# Patient Record
Sex: MALE | Race: WHITE | NOT HISPANIC OR LATINO | URBAN - METROPOLITAN AREA
[De-identification: names, ages, dates, MRNs, and addresses within clinical notes are randomized per-mention and may not be internally consistent; named-entity substitution may affect disease eponyms.]

---

## 2017-04-05 ENCOUNTER — OUTPATIENT (OUTPATIENT)
Dept: OUTPATIENT SERVICES | Facility: HOSPITAL | Age: 82
LOS: 1 days | Discharge: HOME | End: 2017-04-05

## 2017-06-27 DIAGNOSIS — M46.22 OSTEOMYELITIS OF VERTEBRA, CERVICAL REGION: ICD-10-CM

## 2017-06-27 DIAGNOSIS — E11.9 TYPE 2 DIABETES MELLITUS WITHOUT COMPLICATIONS: ICD-10-CM

## 2017-06-27 DIAGNOSIS — E03.9 HYPOTHYROIDISM, UNSPECIFIED: ICD-10-CM

## 2017-06-27 DIAGNOSIS — E78.5 HYPERLIPIDEMIA, UNSPECIFIED: ICD-10-CM

## 2017-06-27 DIAGNOSIS — E55.9 VITAMIN D DEFICIENCY, UNSPECIFIED: ICD-10-CM

## 2017-06-28 ENCOUNTER — OUTPATIENT (OUTPATIENT)
Dept: OUTPATIENT SERVICES | Facility: HOSPITAL | Age: 82
LOS: 1 days | Discharge: HOME | End: 2017-06-28

## 2017-06-28 DIAGNOSIS — N17.9 ACUTE KIDNEY FAILURE, UNSPECIFIED: ICD-10-CM

## 2017-06-28 DIAGNOSIS — E87.6 HYPOKALEMIA: ICD-10-CM

## 2017-06-28 DIAGNOSIS — E87.1 HYPO-OSMOLALITY AND HYPONATREMIA: ICD-10-CM

## 2017-06-28 DIAGNOSIS — E03.9 HYPOTHYROIDISM, UNSPECIFIED: ICD-10-CM

## 2017-06-28 DIAGNOSIS — J96.00 ACUTE RESPIRATORY FAILURE, UNSPECIFIED WHETHER WITH HYPOXIA OR HYPERCAPNIA: ICD-10-CM

## 2017-06-28 DIAGNOSIS — I21.4 NON-ST ELEVATION (NSTEMI) MYOCARDIAL INFARCTION: ICD-10-CM

## 2017-06-28 DIAGNOSIS — D50.9 IRON DEFICIENCY ANEMIA, UNSPECIFIED: ICD-10-CM

## 2017-06-28 DIAGNOSIS — R78.81 BACTEREMIA: ICD-10-CM

## 2017-06-28 DIAGNOSIS — R50.9 FEVER, UNSPECIFIED: ICD-10-CM

## 2017-06-28 DIAGNOSIS — R60.0 LOCALIZED EDEMA: ICD-10-CM

## 2017-06-28 DIAGNOSIS — E53.8 DEFICIENCY OF OTHER SPECIFIED B GROUP VITAMINS: ICD-10-CM

## 2017-06-28 DIAGNOSIS — M48.50XA COLLAPSED VERTEBRA, NOT ELSEWHERE CLASSIFIED, SITE UNSPECIFIED, INITIAL ENCOUNTER FOR FRACTURE: ICD-10-CM

## 2017-06-28 DIAGNOSIS — G95.20 UNSPECIFIED CORD COMPRESSION: ICD-10-CM

## 2017-06-28 DIAGNOSIS — E11.9 TYPE 2 DIABETES MELLITUS WITHOUT COMPLICATIONS: ICD-10-CM

## 2017-06-28 DIAGNOSIS — R79.89 OTHER SPECIFIED ABNORMAL FINDINGS OF BLOOD CHEMISTRY: ICD-10-CM

## 2017-06-28 DIAGNOSIS — M54.9 DORSALGIA, UNSPECIFIED: ICD-10-CM

## 2017-06-28 DIAGNOSIS — E78.5 HYPERLIPIDEMIA, UNSPECIFIED: ICD-10-CM

## 2017-06-28 DIAGNOSIS — R06.89 OTHER ABNORMALITIES OF BREATHING: ICD-10-CM

## 2018-02-14 ENCOUNTER — OUTPATIENT (OUTPATIENT)
Dept: OUTPATIENT SERVICES | Facility: HOSPITAL | Age: 83
LOS: 1 days | Discharge: HOME | End: 2018-02-14

## 2018-02-14 DIAGNOSIS — R79.89 OTHER SPECIFIED ABNORMAL FINDINGS OF BLOOD CHEMISTRY: ICD-10-CM

## 2018-02-14 DIAGNOSIS — D50.9 IRON DEFICIENCY ANEMIA, UNSPECIFIED: ICD-10-CM

## 2018-02-14 DIAGNOSIS — E55.9 VITAMIN D DEFICIENCY, UNSPECIFIED: ICD-10-CM

## 2018-02-14 DIAGNOSIS — N39.0 URINARY TRACT INFECTION, SITE NOT SPECIFIED: ICD-10-CM

## 2018-02-14 DIAGNOSIS — E03.9 HYPOTHYROIDISM, UNSPECIFIED: ICD-10-CM

## 2018-04-19 ENCOUNTER — OUTPATIENT (OUTPATIENT)
Dept: OUTPATIENT SERVICES | Facility: HOSPITAL | Age: 83
LOS: 1 days | Discharge: HOME | End: 2018-04-19

## 2018-04-19 DIAGNOSIS — D64.9 ANEMIA, UNSPECIFIED: ICD-10-CM

## 2018-04-19 DIAGNOSIS — N39.0 URINARY TRACT INFECTION, SITE NOT SPECIFIED: ICD-10-CM

## 2018-04-19 DIAGNOSIS — R79.89 OTHER SPECIFIED ABNORMAL FINDINGS OF BLOOD CHEMISTRY: ICD-10-CM

## 2018-04-19 DIAGNOSIS — E11.65 TYPE 2 DIABETES MELLITUS WITH HYPERGLYCEMIA: ICD-10-CM

## 2018-05-14 ENCOUNTER — OUTPATIENT (OUTPATIENT)
Dept: OUTPATIENT SERVICES | Facility: HOSPITAL | Age: 83
LOS: 1 days | Discharge: HOME | End: 2018-05-14

## 2018-05-14 DIAGNOSIS — R79.89 OTHER SPECIFIED ABNORMAL FINDINGS OF BLOOD CHEMISTRY: ICD-10-CM

## 2018-05-31 PROBLEM — Z00.00 ENCOUNTER FOR PREVENTIVE HEALTH EXAMINATION: Status: ACTIVE | Noted: 2018-05-31

## 2018-06-01 ENCOUNTER — APPOINTMENT (OUTPATIENT)
Dept: GERIATRICS | Facility: HOME HEALTH | Age: 83
End: 2018-06-01

## 2018-06-18 ENCOUNTER — OUTPATIENT (OUTPATIENT)
Dept: OUTPATIENT SERVICES | Facility: HOSPITAL | Age: 83
LOS: 1 days | Discharge: HOME | End: 2018-06-18

## 2018-06-18 DIAGNOSIS — D64.9 ANEMIA, UNSPECIFIED: ICD-10-CM

## 2018-06-18 DIAGNOSIS — E55.9 VITAMIN D DEFICIENCY, UNSPECIFIED: ICD-10-CM

## 2018-06-18 DIAGNOSIS — E53.8 DEFICIENCY OF OTHER SPECIFIED B GROUP VITAMINS: ICD-10-CM

## 2018-06-18 DIAGNOSIS — E03.9 HYPOTHYROIDISM, UNSPECIFIED: ICD-10-CM

## 2018-06-18 DIAGNOSIS — E11.9 TYPE 2 DIABETES MELLITUS WITHOUT COMPLICATIONS: ICD-10-CM

## 2018-06-18 DIAGNOSIS — R70.0 ELEVATED ERYTHROCYTE SEDIMENTATION RATE: ICD-10-CM

## 2018-06-18 DIAGNOSIS — E78.00 PURE HYPERCHOLESTEROLEMIA, UNSPECIFIED: ICD-10-CM

## 2018-06-18 DIAGNOSIS — R79.89 OTHER SPECIFIED ABNORMAL FINDINGS OF BLOOD CHEMISTRY: ICD-10-CM

## 2018-06-18 DIAGNOSIS — R79.82 ELEVATED C-REACTIVE PROTEIN (CRP): ICD-10-CM

## 2018-07-11 ENCOUNTER — OUTPATIENT (OUTPATIENT)
Dept: OUTPATIENT SERVICES | Facility: HOSPITAL | Age: 83
LOS: 1 days | Discharge: HOME | End: 2018-07-11

## 2018-07-11 DIAGNOSIS — N39.0 URINARY TRACT INFECTION, SITE NOT SPECIFIED: ICD-10-CM

## 2018-08-03 ENCOUNTER — OUTPATIENT (OUTPATIENT)
Dept: OUTPATIENT SERVICES | Facility: HOSPITAL | Age: 83
LOS: 1 days | Discharge: HOME | End: 2018-08-03

## 2018-08-03 DIAGNOSIS — R79.89 OTHER SPECIFIED ABNORMAL FINDINGS OF BLOOD CHEMISTRY: ICD-10-CM

## 2018-08-03 DIAGNOSIS — D64.9 ANEMIA, UNSPECIFIED: ICD-10-CM

## 2018-08-03 DIAGNOSIS — N39.0 URINARY TRACT INFECTION, SITE NOT SPECIFIED: ICD-10-CM

## 2018-08-08 ENCOUNTER — OUTPATIENT (OUTPATIENT)
Dept: OUTPATIENT SERVICES | Facility: HOSPITAL | Age: 83
LOS: 1 days | Discharge: HOME | End: 2018-08-08

## 2018-08-08 ENCOUNTER — LABORATORY RESULT (OUTPATIENT)
Age: 83
End: 2018-08-08

## 2018-08-08 DIAGNOSIS — D64.9 ANEMIA, UNSPECIFIED: ICD-10-CM

## 2018-08-08 DIAGNOSIS — R79.89 OTHER SPECIFIED ABNORMAL FINDINGS OF BLOOD CHEMISTRY: ICD-10-CM

## 2018-08-08 DIAGNOSIS — E78.2 MIXED HYPERLIPIDEMIA: ICD-10-CM

## 2018-08-08 DIAGNOSIS — N39.0 URINARY TRACT INFECTION, SITE NOT SPECIFIED: ICD-10-CM

## 2018-08-10 LAB — NON-GYNECOLOGICAL CYTOLOGY STUDY: SIGNIFICANT CHANGE UP

## 2018-08-13 ENCOUNTER — LABORATORY RESULT (OUTPATIENT)
Age: 83
End: 2018-08-13

## 2018-08-13 ENCOUNTER — OUTPATIENT (OUTPATIENT)
Dept: OUTPATIENT SERVICES | Facility: HOSPITAL | Age: 83
LOS: 1 days | Discharge: HOME | End: 2018-08-13

## 2018-08-13 DIAGNOSIS — R79.89 OTHER SPECIFIED ABNORMAL FINDINGS OF BLOOD CHEMISTRY: ICD-10-CM

## 2018-08-13 DIAGNOSIS — D64.9 ANEMIA, UNSPECIFIED: ICD-10-CM

## 2018-08-13 DIAGNOSIS — R94.5 ABNORMAL RESULTS OF LIVER FUNCTION STUDIES: ICD-10-CM

## 2018-08-16 ENCOUNTER — OUTPATIENT (OUTPATIENT)
Dept: OUTPATIENT SERVICES | Facility: HOSPITAL | Age: 83
LOS: 1 days | Discharge: HOME | End: 2018-08-16

## 2018-08-16 ENCOUNTER — INPATIENT (INPATIENT)
Facility: HOSPITAL | Age: 83
LOS: 3 days | Discharge: HOME HEALTH | End: 2018-08-20
Attending: INTERNAL MEDICINE | Admitting: INTERNAL MEDICINE
Payer: MEDICARE

## 2018-08-16 VITALS
HEART RATE: 69 BPM | HEIGHT: 68 IN | TEMPERATURE: 96 F | DIASTOLIC BLOOD PRESSURE: 87 MMHG | RESPIRATION RATE: 16 BRPM | WEIGHT: 160.06 LBS | OXYGEN SATURATION: 97 % | SYSTOLIC BLOOD PRESSURE: 179 MMHG

## 2018-08-16 DIAGNOSIS — I10 ESSENTIAL (PRIMARY) HYPERTENSION: ICD-10-CM

## 2018-08-16 DIAGNOSIS — K81.9 CHOLECYSTITIS, UNSPECIFIED: ICD-10-CM

## 2018-08-16 DIAGNOSIS — Z98.890 OTHER SPECIFIED POSTPROCEDURAL STATES: Chronic | ICD-10-CM

## 2018-08-16 DIAGNOSIS — R94.5 ABNORMAL RESULTS OF LIVER FUNCTION STUDIES: ICD-10-CM

## 2018-08-16 DIAGNOSIS — C61 MALIGNANT NEOPLASM OF PROSTATE: ICD-10-CM

## 2018-08-16 DIAGNOSIS — N40.1 BENIGN PROSTATIC HYPERPLASIA WITH LOWER URINARY TRACT SYMPTOMS: ICD-10-CM

## 2018-08-16 DIAGNOSIS — M19.90 UNSPECIFIED OSTEOARTHRITIS, UNSPECIFIED SITE: ICD-10-CM

## 2018-08-16 DIAGNOSIS — D64.9 ANEMIA, UNSPECIFIED: ICD-10-CM

## 2018-08-16 DIAGNOSIS — E87.1 HYPO-OSMOLALITY AND HYPONATREMIA: ICD-10-CM

## 2018-08-16 DIAGNOSIS — R79.89 OTHER SPECIFIED ABNORMAL FINDINGS OF BLOOD CHEMISTRY: ICD-10-CM

## 2018-08-16 DIAGNOSIS — E11.8 TYPE 2 DIABETES MELLITUS WITH UNSPECIFIED COMPLICATIONS: ICD-10-CM

## 2018-08-16 DIAGNOSIS — E83.42 HYPOMAGNESEMIA: ICD-10-CM

## 2018-08-16 LAB
ALBUMIN SERPL ELPH-MCNC: 3.2 G/DL — LOW (ref 3.5–5.2)
ALP SERPL-CCNC: 709 U/L — HIGH (ref 30–115)
ALT FLD-CCNC: 23 U/L — SIGNIFICANT CHANGE UP (ref 0–41)
AMMONIA BLD-MCNC: 34 UMOL/L — SIGNIFICANT CHANGE UP (ref 11–55)
ANION GAP SERPL CALC-SCNC: 16 MMOL/L — HIGH (ref 7–14)
APPEARANCE UR: CLEAR — SIGNIFICANT CHANGE UP
APTT BLD: 35 SEC — SIGNIFICANT CHANGE UP (ref 27–39.2)
AST SERPL-CCNC: 65 U/L — HIGH (ref 0–41)
BACTERIA # UR AUTO: ABNORMAL
BASOPHILS # BLD AUTO: 0.03 K/UL — SIGNIFICANT CHANGE UP (ref 0–0.2)
BASOPHILS NFR BLD AUTO: 0.2 % — SIGNIFICANT CHANGE UP (ref 0–1)
BILIRUB SERPL-MCNC: 3.6 MG/DL — HIGH (ref 0.2–1.2)
BILIRUB UR-MCNC: NEGATIVE — SIGNIFICANT CHANGE UP
BUN SERPL-MCNC: 6 MG/DL — LOW (ref 10–20)
CALCIUM SERPL-MCNC: 8.3 MG/DL — LOW (ref 8.5–10.1)
CHLORIDE SERPL-SCNC: 81 MMOL/L — LOW (ref 98–110)
CO2 SERPL-SCNC: 22 MMOL/L — SIGNIFICANT CHANGE UP (ref 17–32)
COLOR SPEC: YELLOW — SIGNIFICANT CHANGE UP
CREAT SERPL-MCNC: 0.8 MG/DL — SIGNIFICANT CHANGE UP (ref 0.7–1.5)
DIFF PNL FLD: ABNORMAL
EOSINOPHIL # BLD AUTO: 0.27 K/UL — SIGNIFICANT CHANGE UP (ref 0–0.7)
EOSINOPHIL NFR BLD AUTO: 1.7 % — SIGNIFICANT CHANGE UP (ref 0–8)
GLUCOSE SERPL-MCNC: 97 MG/DL — SIGNIFICANT CHANGE UP (ref 70–99)
GLUCOSE UR QL: NEGATIVE MG/DL — SIGNIFICANT CHANGE UP
HCT VFR BLD CALC: 38.3 % — LOW (ref 42–52)
HGB BLD-MCNC: 13.4 G/DL — LOW (ref 14–18)
IMM GRANULOCYTES NFR BLD AUTO: 0.3 % — SIGNIFICANT CHANGE UP (ref 0.1–0.3)
INR BLD: 1.91 RATIO — HIGH (ref 0.65–1.3)
KETONES UR-MCNC: NEGATIVE — SIGNIFICANT CHANGE UP
LACTATE SERPL-SCNC: 2.4 MMOL/L — HIGH (ref 0.5–2.2)
LEUKOCYTE ESTERASE UR-ACNC: SIGNIFICANT CHANGE UP
LIDOCAIN IGE QN: 21 U/L — SIGNIFICANT CHANGE UP (ref 7–60)
LYMPHOCYTES # BLD AUTO: 0.6 K/UL — LOW (ref 1.2–3.4)
LYMPHOCYTES # BLD AUTO: 3.7 % — LOW (ref 20.5–51.1)
MAGNESIUM SERPL-MCNC: 1.4 MG/DL — LOW (ref 1.8–2.4)
MCHC RBC-ENTMCNC: 28.4 PG — SIGNIFICANT CHANGE UP (ref 27–31)
MCHC RBC-ENTMCNC: 35 G/DL — SIGNIFICANT CHANGE UP (ref 32–37)
MCV RBC AUTO: 81.1 FL — SIGNIFICANT CHANGE UP (ref 80–94)
MONOCYTES # BLD AUTO: 1.21 K/UL — HIGH (ref 0.1–0.6)
MONOCYTES NFR BLD AUTO: 7.5 % — SIGNIFICANT CHANGE UP (ref 1.7–9.3)
NEUTROPHILS # BLD AUTO: 13.87 K/UL — HIGH (ref 1.4–6.5)
NEUTROPHILS NFR BLD AUTO: 86.6 % — HIGH (ref 42.2–75.2)
NITRITE UR-MCNC: NEGATIVE — SIGNIFICANT CHANGE UP
NRBC # BLD: 0 /100 WBCS — SIGNIFICANT CHANGE UP (ref 0–0)
PH UR: 6 — SIGNIFICANT CHANGE UP (ref 5–8)
PLATELET # BLD AUTO: 114 K/UL — LOW (ref 130–400)
POTASSIUM SERPL-MCNC: 5 MMOL/L — SIGNIFICANT CHANGE UP (ref 3.5–5)
POTASSIUM SERPL-SCNC: 5 MMOL/L — SIGNIFICANT CHANGE UP (ref 3.5–5)
PROT SERPL-MCNC: 6.8 G/DL — SIGNIFICANT CHANGE UP (ref 6–8)
PROT UR-MCNC: ABNORMAL MG/DL
PROTHROM AB SERPL-ACNC: 20.9 SEC — HIGH (ref 9.95–12.87)
RBC # BLD: 4.72 M/UL — SIGNIFICANT CHANGE UP (ref 4.7–6.1)
RBC # FLD: 15.6 % — HIGH (ref 11.5–14.5)
RBC CASTS # UR COMP ASSIST: >50 /HPF
SODIUM SERPL-SCNC: 119 MMOL/L — CRITICAL LOW (ref 135–146)
SP GR SPEC: 1.02 — SIGNIFICANT CHANGE UP (ref 1.01–1.03)
UROBILINOGEN FLD QL: 0.2 MG/DL — SIGNIFICANT CHANGE UP (ref 0.2–0.2)
WBC # BLD: 16.03 K/UL — HIGH (ref 4.8–10.8)
WBC # FLD AUTO: 16.03 K/UL — HIGH (ref 4.8–10.8)
WBC UR QL: >50 /HPF

## 2018-08-16 RX ORDER — MAGNESIUM SULFATE 500 MG/ML
2 VIAL (ML) INJECTION ONCE
Qty: 0 | Refills: 0 | Status: COMPLETED | OUTPATIENT
Start: 2018-08-16 | End: 2018-08-16

## 2018-08-16 RX ORDER — NORTRIPTYLINE HYDROCHLORIDE 10 MG/1
0 CAPSULE ORAL
Qty: 0 | Refills: 0 | COMMUNITY

## 2018-08-16 RX ORDER — METOPROLOL TARTRATE 50 MG
1 TABLET ORAL
Qty: 0 | Refills: 0 | COMMUNITY

## 2018-08-16 RX ORDER — CEFEPIME 1 G/1
2000 INJECTION, POWDER, FOR SOLUTION INTRAMUSCULAR; INTRAVENOUS ONCE
Qty: 0 | Refills: 0 | Status: COMPLETED | OUTPATIENT
Start: 2018-08-16 | End: 2018-08-16

## 2018-08-16 RX ORDER — PANTOPRAZOLE SODIUM 20 MG/1
1 TABLET, DELAYED RELEASE ORAL
Qty: 0 | Refills: 0 | COMMUNITY

## 2018-08-16 RX ORDER — MUPIROCIN 20 MG/G
0 OINTMENT TOPICAL
Qty: 0 | Refills: 0 | COMMUNITY

## 2018-08-16 RX ORDER — SODIUM CHLORIDE 9 MG/ML
1000 INJECTION INTRAMUSCULAR; INTRAVENOUS; SUBCUTANEOUS ONCE
Qty: 0 | Refills: 0 | Status: COMPLETED | OUTPATIENT
Start: 2018-08-16 | End: 2018-08-16

## 2018-08-16 RX ORDER — ZOLPIDEM TARTRATE 10 MG/1
1 TABLET ORAL
Qty: 0 | Refills: 0 | COMMUNITY

## 2018-08-16 RX ADMIN — Medication 50 GRAM(S): at 19:37

## 2018-08-16 RX ADMIN — CEFEPIME 100 MILLIGRAM(S): 1 INJECTION, POWDER, FOR SOLUTION INTRAMUSCULAR; INTRAVENOUS at 22:30

## 2018-08-16 RX ADMIN — SODIUM CHLORIDE 1000 MILLILITER(S): 9 INJECTION INTRAMUSCULAR; INTRAVENOUS; SUBCUTANEOUS at 19:37

## 2018-08-16 NOTE — H&P ADULT - NSHPLABSRESULTS_GEN_ALL_CORE
< from: CT Abdomen and Pelvis w/ IV Cont (08.16.18 @ 19:17) >    EXAM:  CT ABDOMEN AND PELVIS IC          PROCEDURE DATE:  08/16/2018      IMPRESSION:   1.  Cirrhosis with evidence of portal hypertension including small volume   abdominopelvic ascites.    2.  Mild left intrahepatic biliary ductal dilation. Correlate with LFTs.    3.  Gallbladder sludge and/or cholelithiasis.    MICKEY UMAÑA M.D., ATTENDINGRADIOLOGIST  This document has been electronically signed. Aug 16 2018  7:27PM      < end of copied text >

## 2018-08-16 NOTE — H&P ADULT - HISTORY OF PRESENT ILLNESS
88m 86yo male being treated at home with oral antibiotics (Cipro and Flagyl) for gall stones (per ER) was advised to come to the hospital by his PMD due to elevated WBC and his wife reported increased abdominal distention. Patient himself (there is mention of some confusion) denies any pain, fevers or vomiting and he claims to be tolerating his liquid diet at home.

## 2018-08-16 NOTE — ED PROVIDER NOTE - OBJECTIVE STATEMENT
85yM with hx of DM, HTN, prostate CA in remission, laminectomy, indwelling enrique catheter presents to ED for abnormal lab values.  Pt states that 85yM with hx of DM, HTN, prostate CA in remission, laminectomy, indwelling enrique catheter presents to ED for abnormal lab values.  Pt's wife states that she was contacted by pt's surgeon, informed that pt had elevated WBC count.  Pt is currently being treated for known cholelithiasis, surgeon expressed concern for possible cholecystitis.  No fever.  No abd pain.  No n/v/d.  No CP, SOB.  No cough.  Wife notes that pt's abd is more distended than typical. 85yM with hx of DM, HTN, prostate CA, laminectomy, indwelling enrique catheter presents to ED for abnormal lab values.  Pt's wife states that she was contacted by pt's PMD, informed that pt had elevated WBC count.  Pt is currently being treated for known cholelithiasis with cipro and flagyl.  No fever.  No abd pain.  No n/v/d.  No CP, SOB.  No cough.  Wife notes that pt's abd is more distended than typical with confusion increasing x a few months.

## 2018-08-16 NOTE — ED ADULT NURSE NOTE - PMH
Arthritis    Diabetes    Diverticulosis    HTN (hypertension)    Osteomyelitis    Pneumonia    Prostate cancer    Sleep apnea

## 2018-08-16 NOTE — ED ADULT TRIAGE NOTE - CHIEF COMPLAINT QUOTE
As per wife: "I  was called by his doctor and was told that his White count is elevated and some other blood results are abnormal. He was being treated with gallstones at home and he might need a procedure."

## 2018-08-16 NOTE — ED PROVIDER NOTE - NS ED ROS FT
Review of Systems    Constitutional: (-) fever/ chills (-) weight loss  Eyes/ENT: (-) blurry vision, (-) epistaxis (-) sore throat (-) ear pain  Cardiovascular: (-) chest pain, (-) syncope (-) palpitations  Respiratory: (-) cough, (-) shortness of breath  Gastrointestinal: (-) vomiting, (-) diarrhea (-) abdominal pain  Musculoskeletal: (-) neck pain, (-) back pain, (-) joint pain (-) pedal edema   Integumentary: (-) rash, (-) swelling  Neurological: (-) headache  Psychiatric: (-) hallucinations

## 2018-08-16 NOTE — ED PROVIDER NOTE - PROGRESS NOTE DETAILS
spoke with oliver of surgery . recommends GI consultation. dr. garay aware of admission dr. smyth evaluated the patient from ICU. patient to be admitted to [facility] surgical floor

## 2018-08-16 NOTE — H&P ADULT - PROBLEM SELECTOR PLAN 1
ductal dilatation-for now will use IV cipro and IV flagyl as recommended by intensivist pending GI recommendation

## 2018-08-16 NOTE — ED PROVIDER NOTE - CARE PLAN
Principal Discharge DX:	Cholecystitis  Secondary Diagnosis:	Hypomagnesemia  Secondary Diagnosis:	Hyponatremia

## 2018-08-16 NOTE — ED PROVIDER NOTE - MEDICAL DECISION MAKING DETAILS
I reviewed outpt labs.  WBC was 6 on  and 11 earlier today.  Sodium has been declining since , 130,  128,  121.  I spoke with Dr Baeza at  who has been involved in pts care.  States that his alk phos has been in the 700s and he has been treated for choleycystitis with po abx and liquid diet.   d/w intensivist, pt to be admitted to floor, surgery aware.  Dr Duffy accepts admission.

## 2018-08-16 NOTE — ED ADULT NURSE NOTE - NSIMPLEMENTINTERV_GEN_ALL_ED
Implemented All Fall Risk Interventions:  Lillian to call system. Call bell, personal items and telephone within reach. Instruct patient to call for assistance. Room bathroom lighting operational. Non-slip footwear when patient is off stretcher. Physically safe environment: no spills, clutter or unnecessary equipment. Stretcher in lowest position, wheels locked, appropriate side rails in place. Provide visual cue, wrist band, yellow gown, etc. Monitor gait and stability. Monitor for mental status changes and reorient to person, place, and time. Review medications for side effects contributing to fall risk. Reinforce activity limits and safety measures with patient and family.

## 2018-08-16 NOTE — ED PROVIDER NOTE - PHYSICAL EXAMINATION
Vital Signs: I have reviewed the initial vital signs.  Constitutional: well-nourished, no acute distress, normocephalic  ENT: MMM  Cardiovascular: regular rate, regular rhythm, no murmur appreciated  Respiratory: unlabored respiratory effort, clear to auscultation bilaterally  Gastrointestinal: soft, non-tender, moderately distended abd (+)fluid wave, no pulsatile mass  Musculoskeletal: supple neck, no lower extremity edema, no bony tenderness  Integumentary: warm, dry, wound noted to back wall, no signs of surrounding infection or redness  Neurologic: awake, alert, extremities’ motor and sensory functions grossly intact, no focal deficits, GCS 15  Psychiatric: appropriate mood, appropriate affect Vital Signs: I have reviewed the initial vital signs.  Constitutional: well-nourished, no acute distress, normocephalic  ENT: MMM  Cardiovascular: regular rate, regular rhythm, no murmur appreciated  Respiratory: unlabored respiratory effort, clear to auscultation bilaterally  Gastrointestinal: soft, non-tender, moderately distended abd (+)fluid wave, no pulsatile mass  :  Sosa catheter in place draining yellow urine with debris.    Musculoskeletal: supple neck, (+)lower extremity edema, no bony tenderness  Integumentary: warm, dry, wound noted to back wall, no signs of surrounding infection or redness  Neurologic: awake, alert, extremities’ motor and sensory functions grossly intact, no focal deficits, GCS 15  Psychiatric: appropriate mood, appropriate affect Vital Signs: I have reviewed the initial vital signs.  Constitutional: chronically illness, no acute distress, normocephalic  ENT: MMM  Cardiovascular: regular rate, regular rhythm, no murmur appreciated  Respiratory: unlabored respiratory effort, clear to auscultation bilaterally  Gastrointestinal: soft, non-tender, moderately distended abd (+)fluid wave, no pulsatile mass  :  Sosa catheter in place draining dark urine with debris.    Musculoskeletal: supple neck, (+)pitting lower extremity edema b/l, no bony tenderness  Integumentary: warm, dry, open wound noted to upper back, no signs of surrounding infection or redness  Neurologic: awake, alert, extremities’ motor and sensory functions grossly intact, no focal deficits, GCS 15  Psychiatric: appropriate mood, appropriate affect

## 2018-08-16 NOTE — CONSULT NOTE ADULT - SUBJECTIVE AND OBJECTIVE BOX
Patient is a 85y old  Male who presents with a chief complaint of abdminal pain x 5 days failed out pt therapy, also found to be hyponatremic. PMHx of DM HTN     Social hx : non smoker  Family hx: none       REVIEW OF SYSTEMS  General: abdominal pain   Skin/Breast:none	  Ophthalmologic:none  ENMT:	none  Respiratory and Thorax: sob occasionally 	  Cardiovascular:	none  Gastrointestinal:	 MONA pain   Genitourinary:	    Allergies  No Known Allergies    T(F): 96.4 (08-16-18 @ 16:33), Max: 96.4 (08-16-18 @ 16:33)  HR: 69 (08-16-18 @ 16:33)  BP: 179/87 (08-16-18 @ 16:33)  RR: 16 (08-16-18 @ 16:33)  SpO2: 97% (08-16-18 @ 16:33) (97% - 97%)      PHYSICAL EXAM:  GENERAL: NAD, well-groomed, well-developed  HEAD:  Atraumatic, Normocephalic  EYES: EOMI, PERRLA, conjunctiva and sclera clear  ENMT: No tonsillar erythema, exudates, or enlargement; Moist mucous membranes, Good dentition, No lesions  NECK: Supple, No JVD, Normal thyroid  NERVOUS SYSTEM:  Alert & Oriented X3, Good concentration; Motor Strength 5/5 B/L upper and lower extremities; DTRs 2+ intact and symmetric  CHEST/LUNG: Clear to percussion bilaterally; No rales, rhonchi, wheezing, or rubs  HEART: Regular rate and rhythm; No murmurs, rubs, or gallops  ABDOMEN: Soft, Nontender, Nondistended; Bowel sounds present  EXTREMITIES:  2+ Peripheral Pulses, No clubbing, cyanosis, or edema  LYMPH: No lymphadenopathy noted  SKIN: No rashes or lesions    labs  08-16    119<LL>  |  81<L>  |  6<L>  ----------------------------<  97  5.0   |  22  |  0.8    Ca    8.3<L>      16 Aug 2018 17:30  Mg     1.4     08-16    TPro  6.8  /  Alb  3.2<L>  /  TBili  3.6<H>  /  DBili  x   /  AST  65<H>  /  ALT  23  /  AlkPhos  709<H>  08-16                          13.4   16.03 )-----------( 114      ( 16 Aug 2018 17:30 )             38.3         PT/INR - ( 16 Aug 2018 17:30 )   PT: 20.90 sec;   INR: 1.91 ratio         PTT - ( 16 Aug 2018 17:30 )  PTT:35.0 sec      radiology    cefepime   IVPB 2000 milliGRAM(s) IV Intermittent once

## 2018-08-16 NOTE — ED PROVIDER NOTE - ATTENDING CONTRIBUTION TO CARE
84 yo M PMHx noted including h/o prostate cancer, DM, indwelling Sosa catheter, laminectomy with chronic wound to back presents from home accompanied by wife with c/o abnormal lab results.  Wife explains that patient is being treated for gallbladder problem at home with abx.  He had blood work and sonogram at home.  Today was tod to come to ED for abnormal WBC count and low sodium and that patient may need a procedure.  no fevers.  Pt has been on liquid diet until today when he had some chicken.  On exam pt in NAD AAO x 3, appears pale, Lungs CAT B/L, + open wound noted to upper back, no drainage, abd is softly distended, + tympanic, no rash, NT, + bilateral LE edema

## 2018-08-17 DIAGNOSIS — Z02.9 ENCOUNTER FOR ADMINISTRATIVE EXAMINATIONS, UNSPECIFIED: ICD-10-CM

## 2018-08-17 DIAGNOSIS — A41.9 SEPSIS, UNSPECIFIED ORGANISM: ICD-10-CM

## 2018-08-17 DIAGNOSIS — T84.9XXS UNSPECIFIED COMPLICATION OF INTERNAL ORTHOPEDIC PROSTHETIC DEVICE, IMPLANT AND GRAFT, SEQUELA: ICD-10-CM

## 2018-08-17 DIAGNOSIS — D72.829 ELEVATED WHITE BLOOD CELL COUNT, UNSPECIFIED: ICD-10-CM

## 2018-08-17 DIAGNOSIS — K80.20 CALCULUS OF GALLBLADDER WITHOUT CHOLECYSTITIS WITHOUT OBSTRUCTION: ICD-10-CM

## 2018-08-17 DIAGNOSIS — D68.9 COAGULATION DEFECT, UNSPECIFIED: ICD-10-CM

## 2018-08-17 DIAGNOSIS — K74.60 UNSPECIFIED CIRRHOSIS OF LIVER: ICD-10-CM

## 2018-08-17 DIAGNOSIS — R33.9 RETENTION OF URINE, UNSPECIFIED: ICD-10-CM

## 2018-08-17 LAB
ALBUMIN SERPL ELPH-MCNC: 2.9 G/DL — LOW (ref 3.5–5.2)
ALBUMIN SERPL ELPH-MCNC: 3 G/DL — LOW (ref 3.5–5.2)
ALP SERPL-CCNC: 623 U/L — HIGH (ref 30–115)
ALP SERPL-CCNC: 656 U/L — HIGH (ref 30–115)
ALT FLD-CCNC: 18 U/L — SIGNIFICANT CHANGE UP (ref 0–41)
ALT FLD-CCNC: 20 U/L — SIGNIFICANT CHANGE UP (ref 0–41)
AMMONIA BLD-MCNC: 42 UMOL/L — SIGNIFICANT CHANGE UP (ref 11–55)
ANION GAP SERPL CALC-SCNC: 12 MMOL/L — SIGNIFICANT CHANGE UP (ref 7–14)
ANION GAP SERPL CALC-SCNC: 14 MMOL/L — SIGNIFICANT CHANGE UP (ref 7–14)
APTT BLD: 37.7 SEC — SIGNIFICANT CHANGE UP (ref 27–39.2)
AST SERPL-CCNC: 44 U/L — HIGH (ref 0–41)
AST SERPL-CCNC: 50 U/L — HIGH (ref 0–41)
BILIRUB DIRECT SERPL-MCNC: 2.3 MG/DL — HIGH (ref 0–0.2)
BILIRUB INDIRECT FLD-MCNC: 1.2 MG/DL — SIGNIFICANT CHANGE UP (ref 0.2–1.2)
BILIRUB SERPL-MCNC: 3.5 MG/DL — HIGH (ref 0.2–1.2)
BILIRUB SERPL-MCNC: 3.5 MG/DL — HIGH (ref 0.2–1.2)
BUN SERPL-MCNC: 7 MG/DL — LOW (ref 10–20)
BUN SERPL-MCNC: 7 MG/DL — LOW (ref 10–20)
CALCIUM SERPL-MCNC: 7.6 MG/DL — LOW (ref 8.5–10.1)
CALCIUM SERPL-MCNC: 7.8 MG/DL — LOW (ref 8.5–10.1)
CHLORIDE SERPL-SCNC: 85 MMOL/L — LOW (ref 98–110)
CHLORIDE SERPL-SCNC: 89 MMOL/L — LOW (ref 98–110)
CO2 SERPL-SCNC: 20 MMOL/L — SIGNIFICANT CHANGE UP (ref 17–32)
CO2 SERPL-SCNC: 21 MMOL/L — SIGNIFICANT CHANGE UP (ref 17–32)
CREAT SERPL-MCNC: 0.7 MG/DL — SIGNIFICANT CHANGE UP (ref 0.7–1.5)
CREAT SERPL-MCNC: 0.8 MG/DL — SIGNIFICANT CHANGE UP (ref 0.7–1.5)
GLUCOSE SERPL-MCNC: 103 MG/DL — HIGH (ref 70–99)
GLUCOSE SERPL-MCNC: 89 MG/DL — SIGNIFICANT CHANGE UP (ref 70–99)
HCT VFR BLD CALC: 34.1 % — LOW (ref 42–52)
HCT VFR BLD CALC: 35.8 % — LOW (ref 42–52)
HGB BLD-MCNC: 11.7 G/DL — LOW (ref 14–18)
HGB BLD-MCNC: 12.5 G/DL — LOW (ref 14–18)
INR BLD: 2.05 RATIO — HIGH (ref 0.65–1.3)
MAGNESIUM SERPL-MCNC: 1.7 MG/DL — LOW (ref 1.8–2.4)
MAGNESIUM SERPL-MCNC: 1.8 MG/DL — SIGNIFICANT CHANGE UP (ref 1.8–2.4)
MCHC RBC-ENTMCNC: 28.2 PG — SIGNIFICANT CHANGE UP (ref 27–31)
MCHC RBC-ENTMCNC: 28.5 PG — SIGNIFICANT CHANGE UP (ref 27–31)
MCHC RBC-ENTMCNC: 34.3 G/DL — SIGNIFICANT CHANGE UP (ref 32–37)
MCHC RBC-ENTMCNC: 34.9 G/DL — SIGNIFICANT CHANGE UP (ref 32–37)
MCV RBC AUTO: 81.5 FL — SIGNIFICANT CHANGE UP (ref 80–94)
MCV RBC AUTO: 82.2 FL — SIGNIFICANT CHANGE UP (ref 80–94)
NRBC # BLD: 0 /100 WBCS — SIGNIFICANT CHANGE UP (ref 0–0)
NRBC # BLD: 0 /100 WBCS — SIGNIFICANT CHANGE UP (ref 0–0)
NT-PROBNP SERPL-SCNC: 150 PG/ML — SIGNIFICANT CHANGE UP (ref 0–300)
PLATELET # BLD AUTO: 101 K/UL — LOW (ref 130–400)
PLATELET # BLD AUTO: 102 K/UL — LOW (ref 130–400)
POTASSIUM SERPL-MCNC: 4 MMOL/L — SIGNIFICANT CHANGE UP (ref 3.5–5)
POTASSIUM SERPL-MCNC: 4.2 MMOL/L — SIGNIFICANT CHANGE UP (ref 3.5–5)
POTASSIUM SERPL-SCNC: 4 MMOL/L — SIGNIFICANT CHANGE UP (ref 3.5–5)
POTASSIUM SERPL-SCNC: 4.2 MMOL/L — SIGNIFICANT CHANGE UP (ref 3.5–5)
PROT SERPL-MCNC: 5.8 G/DL — LOW (ref 6–8)
PROT SERPL-MCNC: 6.1 G/DL — SIGNIFICANT CHANGE UP (ref 6–8)
PROTHROM AB SERPL-ACNC: 22.5 SEC — HIGH (ref 9.95–12.87)
RBC # BLD: 4.15 M/UL — LOW (ref 4.7–6.1)
RBC # BLD: 4.39 M/UL — LOW (ref 4.7–6.1)
RBC # FLD: 15.9 % — HIGH (ref 11.5–14.5)
RBC # FLD: 16 % — HIGH (ref 11.5–14.5)
SODIUM SERPL-SCNC: 120 MMOL/L — LOW (ref 135–146)
SODIUM SERPL-SCNC: 121 MMOL/L — LOW (ref 135–146)
WBC # BLD: 13.56 K/UL — HIGH (ref 4.8–10.8)
WBC # BLD: 13.97 K/UL — HIGH (ref 4.8–10.8)
WBC # FLD AUTO: 13.56 K/UL — HIGH (ref 4.8–10.8)
WBC # FLD AUTO: 13.97 K/UL — HIGH (ref 4.8–10.8)

## 2018-08-17 PROCEDURE — 99221 1ST HOSP IP/OBS SF/LOW 40: CPT

## 2018-08-17 RX ORDER — SODIUM CHLORIDE 9 MG/ML
1000 INJECTION INTRAMUSCULAR; INTRAVENOUS; SUBCUTANEOUS
Qty: 0 | Refills: 0 | Status: COMPLETED | OUTPATIENT
Start: 2018-08-17 | End: 2018-08-17

## 2018-08-17 RX ORDER — SODIUM CHLORIDE 9 MG/ML
1000 INJECTION INTRAMUSCULAR; INTRAVENOUS; SUBCUTANEOUS
Qty: 0 | Refills: 0 | Status: DISCONTINUED | OUTPATIENT
Start: 2018-08-17 | End: 2018-08-18

## 2018-08-17 RX ORDER — ZOLPIDEM TARTRATE 10 MG/1
5 TABLET ORAL AT BEDTIME
Qty: 0 | Refills: 0 | Status: DISCONTINUED | OUTPATIENT
Start: 2018-08-17 | End: 2018-08-20

## 2018-08-17 RX ORDER — METRONIDAZOLE 500 MG
500 TABLET ORAL EVERY 8 HOURS
Qty: 0 | Refills: 0 | Status: DISCONTINUED | OUTPATIENT
Start: 2018-08-17 | End: 2018-08-17

## 2018-08-17 RX ORDER — ENOXAPARIN SODIUM 100 MG/ML
40 INJECTION SUBCUTANEOUS DAILY
Qty: 0 | Refills: 0 | Status: DISCONTINUED | OUTPATIENT
Start: 2018-08-17 | End: 2018-08-18

## 2018-08-17 RX ORDER — MEROPENEM 1 G/30ML
500 INJECTION INTRAVENOUS EVERY 8 HOURS
Qty: 0 | Refills: 0 | Status: DISCONTINUED | OUTPATIENT
Start: 2018-08-17 | End: 2018-08-20

## 2018-08-17 RX ORDER — METOPROLOL TARTRATE 50 MG
25 TABLET ORAL
Qty: 0 | Refills: 0 | Status: DISCONTINUED | OUTPATIENT
Start: 2018-08-17 | End: 2018-08-20

## 2018-08-17 RX ORDER — HEPARIN SODIUM 5000 [USP'U]/ML
5000 INJECTION INTRAVENOUS; SUBCUTANEOUS EVERY 12 HOURS
Qty: 0 | Refills: 0 | Status: DISCONTINUED | OUTPATIENT
Start: 2018-08-17 | End: 2018-08-17

## 2018-08-17 RX ORDER — MEROPENEM 1 G/30ML
INJECTION INTRAVENOUS
Qty: 0 | Refills: 0 | Status: DISCONTINUED | OUTPATIENT
Start: 2018-08-17 | End: 2018-08-20

## 2018-08-17 RX ORDER — CIPROFLOXACIN LACTATE 400MG/40ML
200 VIAL (ML) INTRAVENOUS EVERY 12 HOURS
Qty: 0 | Refills: 0 | Status: DISCONTINUED | OUTPATIENT
Start: 2018-08-17 | End: 2018-08-17

## 2018-08-17 RX ORDER — SENNA PLUS 8.6 MG/1
1 TABLET ORAL AT BEDTIME
Qty: 0 | Refills: 0 | Status: DISCONTINUED | OUTPATIENT
Start: 2018-08-17 | End: 2018-08-20

## 2018-08-17 RX ORDER — PANTOPRAZOLE SODIUM 20 MG/1
40 TABLET, DELAYED RELEASE ORAL
Qty: 0 | Refills: 0 | Status: DISCONTINUED | OUTPATIENT
Start: 2018-08-17 | End: 2018-08-20

## 2018-08-17 RX ORDER — MUPIROCIN 20 MG/G
1 OINTMENT TOPICAL
Qty: 0 | Refills: 0 | Status: DISCONTINUED | OUTPATIENT
Start: 2018-08-17 | End: 2018-08-20

## 2018-08-17 RX ORDER — MEROPENEM 1 G/30ML
500 INJECTION INTRAVENOUS ONCE
Qty: 0 | Refills: 0 | Status: COMPLETED | OUTPATIENT
Start: 2018-08-17 | End: 2018-08-17

## 2018-08-17 RX ORDER — ASPIRIN/CALCIUM CARB/MAGNESIUM 324 MG
81 TABLET ORAL DAILY
Qty: 0 | Refills: 0 | Status: DISCONTINUED | OUTPATIENT
Start: 2018-08-18 | End: 2018-08-20

## 2018-08-17 RX ORDER — FENTANYL CITRATE 50 UG/ML
1 INJECTION INTRAVENOUS
Qty: 0 | Refills: 0 | Status: DISCONTINUED | OUTPATIENT
Start: 2018-08-17 | End: 2018-08-20

## 2018-08-17 RX ORDER — NORTRIPTYLINE HYDROCHLORIDE 10 MG/1
10 CAPSULE ORAL AT BEDTIME
Qty: 0 | Refills: 0 | Status: DISCONTINUED | OUTPATIENT
Start: 2018-08-17 | End: 2018-08-20

## 2018-08-17 RX ADMIN — Medication 25 MILLIGRAM(S): at 18:15

## 2018-08-17 RX ADMIN — FENTANYL CITRATE 1 PATCH: 50 INJECTION INTRAVENOUS at 11:47

## 2018-08-17 RX ADMIN — Medication 25 MILLIGRAM(S): at 06:27

## 2018-08-17 RX ADMIN — MUPIROCIN 1 APPLICATION(S): 20 OINTMENT TOPICAL at 18:15

## 2018-08-17 RX ADMIN — MEROPENEM 100 MILLIGRAM(S): 1 INJECTION INTRAVENOUS at 21:22

## 2018-08-17 RX ADMIN — Medication 100 MILLIGRAM(S): at 07:28

## 2018-08-17 RX ADMIN — NORTRIPTYLINE HYDROCHLORIDE 10 MILLIGRAM(S): 10 CAPSULE ORAL at 21:22

## 2018-08-17 RX ADMIN — MUPIROCIN 1 APPLICATION(S): 20 OINTMENT TOPICAL at 06:27

## 2018-08-17 RX ADMIN — Medication 5 MILLIGRAM(S): at 06:27

## 2018-08-17 RX ADMIN — Medication 100 MILLIGRAM(S): at 06:27

## 2018-08-17 RX ADMIN — PANTOPRAZOLE SODIUM 40 MILLIGRAM(S): 20 TABLET, DELAYED RELEASE ORAL at 06:27

## 2018-08-17 RX ADMIN — ENOXAPARIN SODIUM 40 MILLIGRAM(S): 100 INJECTION SUBCUTANEOUS at 11:43

## 2018-08-17 RX ADMIN — SENNA PLUS 1 TABLET(S): 8.6 TABLET ORAL at 21:22

## 2018-08-17 RX ADMIN — SODIUM CHLORIDE 75 MILLILITER(S): 9 INJECTION INTRAMUSCULAR; INTRAVENOUS; SUBCUTANEOUS at 18:14

## 2018-08-17 RX ADMIN — SODIUM CHLORIDE 75 MILLILITER(S): 9 INJECTION INTRAMUSCULAR; INTRAVENOUS; SUBCUTANEOUS at 21:48

## 2018-08-17 RX ADMIN — ZOLPIDEM TARTRATE 5 MILLIGRAM(S): 10 TABLET ORAL at 21:32

## 2018-08-17 RX ADMIN — MEROPENEM 100 MILLIGRAM(S): 1 INJECTION INTRAVENOUS at 11:43

## 2018-08-17 NOTE — CONSULT NOTE ADULT - SUBJECTIVE AND OBJECTIVE BOX
85yM with hx of DM, HTN, prostate CA, laminectomy, indwelling enrique catheter presents to ED for abnormal lab values.  Pt's wife states that she was contacted by pt's PMD, informed that pt had elevated WBC count.  Pt is currently being treated for known cholelithiasis with cipro and flagyl.  No fever.  No abd pain.  No n/v/d.  No CP, SOB.  No cough.  Wife notes that pt's abd is more distended than typical with confusion increasing x a few months. 85yM with hx of DM, HTN, prostate CA, laminectomy, indwelling enrique catheter presents to ED for abnormal lab values.  Pt's wife states that she was contacted by pt's PMD, informed that pt had elevated WBC count.  Pt is currently being treated for known cholelithiasis with cipro and flagyl.  No fever.  No abd pain.  No n/v/d.  No CP, SOB.  No cough.  Wife notes that pt's abd is more distended than typical with confusion increasing x a few months.     Past Medical History:  Arthritis    Diabetes    Diverticulosis    HTN (hypertension)    Osteomyelitis    Pneumonia    Prostate cancer    Sleep apnea.     Past Surgical History:  H/O laminectomy    H/O nephrolithotomy with removal of calculi.    Vital Signs Last 24 Hrs  T(C): 35.5 (17 Aug 2018 00:20), Max: 36.7 (16 Aug 2018 22:19)  T(F): 95.9 (17 Aug 2018 00:20), Max: 98 (16 Aug 2018 22:19)  HR: 84 (17 Aug 2018 00:20) (69 - 84)  BP: 145/67 (17 Aug 2018 00:20) (145/67 - 179/87)  BP(mean): --  RR: 21 (17 Aug 2018 00:20) (16 - 21)  SpO2: 98% (17 Aug 2018 00:20) (97% - 99%) 85yM with hx of DM, HTN, prostate CA, laminectomy, indwelling enrique catheter presents to ED for abnormal lab values.  Pt's wife states that she was contacted by pt's PMD, informed that pt had elevated WBC count.  Pt is currently being treated for known cholelithiasis with cipro and flagyl.  No fever.  No abd pain.  No n/v/d.  No CP, SOB.  No cough.  Wife notes that pt's abd is more distended than typical with confusion increasing x a few months.     Past Medical History:  Arthritis    Diabetes    Diverticulosis    HTN (hypertension)    Osteomyelitis    Pneumonia    Prostate cancer    Sleep apnea.     Past Surgical History:  H/O laminectomy    H/O nephrolithotomy with removal of calculi.    Vital Signs Last 24 Hrs  T(C): 35.5 (17 Aug 2018 00:20), Max: 36.7 (16 Aug 2018 22:19)  T(F): 95.9 (17 Aug 2018 00:20), Max: 98 (16 Aug 2018 22:19)  HR: 84 (17 Aug 2018 00:20) (69 - 84)  BP: 145/67 (17 Aug 2018 00:20) (145/67 - 179/87)  BP(mean): --  RR: 21 (17 Aug 2018 00:20) (16 - 21)  SpO2: 98% (17 Aug 2018 00:20) (97% - 99%)    Constitutional: chronically illness, NAD  	ENT: MMM  	Cardiovascular: regular rate, regular rhythm, no murmur appreciated  	Respiratory: Clear to auscultation bilaterally  	Gastrointestinal: soft, non tender moderately distended abd (+)fluid wave, no pulsatile mass  	:  Enrique catheter in place draining dark urine with debris.    	Musculoskeletal: supple neck, (+)pitting lower extremity edema b/l.  	Integumentary: warm, dry, open wound noted to upper back, no signs of surrounding infection or redness  	Neurologic: awake, alert, extremities’ motor and sensory functions grossly intact, no focal deficits,  Psychiatric: appropriate mood, appropriate affect                              13.4   16.03 )-----------( 114      ( 16 Aug 2018 17:30 )             38.3     08-16    119<LL>  |  81<L>  |  6<L>  ----------------------------<  97  5.0   |  22  |  0.8    Ca    8.3<L>      16 Aug 2018 17:30  Mg     1.4         TPro  6.8  /  Alb  3.2<L>  /  TBili  3.6<H>  /  DBili  x   /  AST  65<H>  /  ALT  23  /  AlkPhos  709<H>            Urinalysis Basic - ( 16 Aug 2018 19:25 )    Color: Yellow / Appearance: Clear / S.020 / pH: x  Gluc: x / Ketone: Negative  / Bili: Negative / Urobili: 0.2 mg/dL   Blood: x / Protein: Trace mg/dL / Nitrite: Negative   Leuk Esterase: Large / RBC: >50 /HPF / WBC >50 /HPF   Sq Epi: x / Non Sq Epi: x / Bacteria: Few      PT/INR - ( 16 Aug 2018 17:30 )   PT: 20.90 sec;   INR: 1.91 ratio         PTT - ( 16 Aug 2018 17:30 )  PTT:35.0 sec      CAPILLARY BLOOD GLUCOSE  89 (16 Aug 2018 17:40)      < from: US Abdomen Limited (18 @ 20:43) >  IMPRESSION:    1.  Cirrhotic liver.    2.  Cholelithiasis and sludgewith nonspecific gallbladder wall   thickening.    3.  Small volume ascites.      < end of copied text >    < from: CT Abdomen and Pelvis w/ IV Cont (18 @ 19:17) >  IMPRESSION:   1.  Cirrhosis with evidence of portal hypertension including small volume   abdominopelvic ascites.    2.  Mild left intrahepatic biliary ductal dilation. Correlate with LFTs.    3.  Gallbladder sludge and/or cholelithiasis.    < end of copied text > 85yM with hx of DM, HTN, prostate CA, laminectomy, indwelling enrique catheter presents to ED for abnormal lab values.  Pt's wife states that she was contacted by pt's PMD, informed that pt had elevated WBC count.  Pt is currently being treated for known cholelithiasis with cipro and flagyl.  No fever.  No abd pain.  No n/v/d.  No CP, SOB.  No cough.  Wife notes that pt's abd is more distended than typical with confusion increasing lately. .      Past Medical History:  Arthritis    Diabetes    Diverticulosis    HTN (hypertension)    Osteomyelitis    Pneumonia    Prostate cancer    Sleep apnea.     Past Surgical History:  H/O laminectomy    H/O nephrolithotomy with removal of calculi.    Vital Signs Last 24 Hrs  T(C): 35.5 (17 Aug 2018 00:20), Max: 36.7 (16 Aug 2018 22:19)  T(F): 95.9 (17 Aug 2018 00:20), Max: 98 (16 Aug 2018 22:19)  HR: 84 (17 Aug 2018 00:20) (69 - 84)  BP: 145/67 (17 Aug 2018 00:20) (145/67 - 179/87)  BP(mean): --  RR: 21 (17 Aug 2018 00:20) (16 - 21)  SpO2: 98% (17 Aug 2018 00:20) (97% - 99%)     Constitutional: chronically illness, NAD  	ENT: MMM  	Cardiovascular: regular rate, regular rhythm, no murmur appreciated  	Respiratory: Clear to auscultation bilaterally  	Gastrointestinal: soft, non tender moderately distended abd (+)fluid wave, no pulsatile mass  	:  Enrique catheter in place draining dark urine with debris.    	Musculoskeletal: supple neck, (+)pitting lower extremity edema b/l.  	Integumentary: warm, dry, open wound noted to upper back, no signs of surrounding infection or redness  	Neurologic: awake, alert, extremities’ motor and sensory functions grossly intact, no focal deficits,    Psychiatric: appropriate mood, appropriate affect                              13.4   16.03 )-----------( 114      ( 16 Aug 2018 17:30 )             38.3     08-16    119<LL>  |  81<L>  |  6<L>  ----------------------------<  97  5.0   |  22  |  0.8    Ca    8.3<L>      16 Aug 2018 17:30  Mg     1.4         TPro  6.8  /  Alb  3.2<L>  /  TBili  3.6<H>  /  DBili  x   /  AST  65<H>  /  ALT  23  /  AlkPhos  709<H>            Urinalysis Basic - ( 16 Aug 2018 19:25 )    Color: Yellow / Appearance: Clear / S.020 / pH: x  Gluc: x / Ketone: Negative  / Bili: Negative / Urobili: 0.2 mg/dL   Blood: x / Protein: Trace mg/dL / Nitrite: Negative   Leuk Esterase: Large / RBC: >50 /HPF / WBC >50 /HPF   Sq Epi: x / Non Sq Epi: x / Bacteria: Few      PT/INR - ( 16 Aug 2018 17:30 )   PT: 20.90 sec;   INR: 1.91 ratio         PTT - ( 16 Aug 2018 17:30 )  PTT:35.0 sec      CAPILLARY BLOOD GLUCOSE  89 (16 Aug 2018 17:40)      < from: US Abdomen Limited (18 @ 20:43) >  IMPRESSION:    1.  Cirrhotic liver.    2.  Cholelithiasis and sludgewith nonspecific gallbladder wall   thickening.    3.  Small volume ascites.      < end of copied text >    < from: CT Abdomen and Pelvis w/ IV Cont (18 @ 19:17) >  IMPRESSION:   1.  Cirrhosis with evidence of portal hypertension including small volume   abdominopelvic ascites.    2.  Mild left intrahepatic biliary ductal dilation. Correlate with LFTs.    3.  Gallbladder sludge and/or cholelithiasis.    < end of copied text >

## 2018-08-17 NOTE — CONSULT NOTE ADULT - SUBJECTIVE AND OBJECTIVE BOX
Gastroenterology Consult    85y yo Male with abnormal liver functions.    Patient is a 85y old  Male who presents with a chief complaint of elevated WBC despite antibiotics (16 Aug 2018 22:30)      HPI:  86yo male being treated at home with oral antibiotics (Cipro and Flagyl) for gall stones (per ER) was advised to come to the hospital by his PMD due to elevated WBC and his wife reported increased abdominal distention. Patient himself (there is mention of some confusion) denies any pain, fevers or vomiting and he claims to be tolerating his liquid diet at home. (16 Aug 2018 22:30)  He denies ever being told of abnormal liver functions, denies alcohol use, denies history of hepatitis.      PAST MEDICAL & SURGICAL HISTORY:  Osteomyelitis  Pneumonia  Arthritis  Prostate cancer  Sleep apnea  Diverticulosis  HTN (hypertension)  Diabetes  H/O nephrolithotomy with removal of calculi  H/O laminectomy      Allergies; No Known Allergies      Medications: enalapril 5 milliGRAM(s) Oral daily  enoxaparin Injectable 40 milliGRAM(s) SubCutaneous daily  fentaNYL   Patch  12 MICROgram(s)/Hr 1 Patch Transdermal every 72 hours  meropenem  IVPB      meropenem  IVPB 500 milliGRAM(s) IV Intermittent every 8 hours  metoprolol tartrate 25 milliGRAM(s) Oral two times a day  mupirocin 2% Ointment 1 Application(s) Topical two times a day  nortriptyline 10 milliGRAM(s) Oral at bedtime  pantoprazole    Tablet 40 milliGRAM(s) Oral before breakfast  senna 1 Tablet(s) Oral at bedtime  zolpidem 5 milliGRAM(s) Oral at bedtime PRN  zolpidem 5 milliGRAM(s) Oral at bedtime PRN      Review of Systems: noncontributory, other than in inital H & P    Physical Examination:  T(C): 36.2 (08-17-18 @ 14:32), Max: 36.7 (08-16-18 @ 22:19)  HR: 78 (08-17-18 @ 14:32) (70 - 84)  BP: 166/68 (08-17-18 @ 14:32) (145/67 - 179/80)  RR: 18 (08-17-18 @ 14:32) (18 - 21)  SpO2: 98% (08-17-18 @ 00:20) (98% - 99%)    GENERAL:  Appears stated age, well-groomed, well-nourished, no distress  HEENT:  NC/AT,  conjunctivae clear and pink, no thyromegaly, nodules, adenopathy, no JVD, sclera -anicteric  CHEST:  Full & symmetric excursion, no increased effort, breath sounds clear  HEART:  Regular rhythm, S1, S2, no murmur/rub/S3/S4, no abdominal bruit, no edema  ABDOMEN:  Soft, non-tender, slightly softlydistended, normoactive bowel sounds,  no masses ,no hepato-splenomegaly, no signs of chronic liver disease  EXTREMITIES:  2 + edema  SKIN:  No rash/erythema/ecchymoses/petechiae/wounds/abscess/warm/dry  NEURO:  Alert, oriented, no asterixis, no tremor, no encephalopathy     Impression: 85y yo Male with abnormal liver functions.  Differential includes toxin/medication or viral effect vs intrinsic liver disease.    Recommendation:  Check Hep A Ab, Hep B SAg, Hep B SAB, Hep C Ab, OLENA, Smooth Muscle antibody, OLENA, Ceruloplasmin, Ferritin, Transferrin Saturation, SPEP  Obain MRCP

## 2018-08-17 NOTE — CONSULT NOTE ADULT - ATTENDING COMMENTS
Pt seen at bedside, with house staff, 0840.  Pt alert and oriented, has no specific c/o.  Chart/labs/imaging reviewed.  H&P as noted.  Abd is soft but distended and tympanitic, no tenderness, palpable masses or hernias, no organomegaly.      IMP ? etiology of WBC; abd benign, GB not likely source, needs w/u including Urine C&S.         Colonic distention may be functional, no obvious lesion noted on CT.  Needs Dulcolax or enemas pr and GI evaluation for poss. endoscopy.         Markedly elevated alk phos also needs w/u tina in the face of cirrhosis, along with correction of electrolytes.           No surgical Rx planned at present. Pt seen at bedside, with house staff, 0840.  Pt alert and oriented, has no specific c/o.  Chart/labs/imaging reviewed.  H&P as noted.  Abd is soft but distended and tympanitic, no tenderness, palpable masses or hernias, no organomegaly.      IMP ? etiology of WBC; abd benign, GB not likely source, needs w/u including Urine C&S.         Colonic distention may be functional, no obvious lesion noted on CT.  Needs Dulcolax or enemas pr and GI evaluation for poss. endoscopy.         Markedly elevated alk phos also needs w/u tina in the face of cirrhosis, along with correction of electrolytes.           No surgical Rx planned at present.  Will follow.

## 2018-08-17 NOTE — PROGRESS NOTE ADULT - SUBJECTIVE AND OBJECTIVE BOX
85 year old gentleman with chronic indwelling enrique with hx prostate cancer is being followed as OP by [facility] MEdical HOme Visit PRogram; he has been treated with oral cipro/flagyl for suspected biliary sepsis but all parameters worsening with increasing wbc and dec serum Na; patient was very reluctant to come to hospital but eventually agreed after conferring with PCP Dr. Maday Baeza.  In ED Na of 119 (last month was NL), wbc of 16 (baseline of 7); abdominal US and abdominal CT both demonstrate cirrhosis with gallstones and sludge, small amount of ascitics and CT describing portal HTN with low volume ascitics admitted and started on IV NS, IV Cipro and IV metronidazole.  Case d/w Dr Baeza, will start Meropenem, consult ID, surgery and GI.  Today pt is c/o weakness, denies abdominal pain, nausea, vomiting, wife by the bedside.     Vital Signs Last 24 Hrs  T(C): 36.2 (17 Aug 2018 14:32), Max: 36.7 (16 Aug 2018 22:19)  T(F): 97.2 (17 Aug 2018 14:32), Max: 98 (16 Aug 2018 22:19)  HR: 78 (17 Aug 2018 14:32) (70 - 84)  BP: 166/68 (17 Aug 2018 14:32) (145/67 - 179/80)  RR: 18 (17 Aug 2018 14:32) (18 - 21)  SpO2: 98% (17 Aug 2018 00:20) (98% - 99%)  PHYSICAL EXAM:  GENERAL: NAD, wheelchair bound   HEAD:  Atraumatic, Normocephalic  NECK: Supple, No JVD, Normal thyroid  NERVOUS SYSTEM:  Alert & Oriented X3, Good concentration; paraplegic   CHEST/LUNG: Clear to percussion bilaterally; No rales, rhonchi, wheezing, or rubs  HEART: Regular rate and rhythm; No murmurs, rubs, or gallops  ABDOMEN: Soft, Nontender, distended with positive BS , Enrique cath with dark urine   EXTREMITIES:  2+ Peripheral Pulses, No clubbing, cyanosis, or edema  LYMPH: No lymphadenopathy noted  SKIN: pt has small open wound on his back , w/o signs of infection ( since )       LABS:                        11.7   13.56 )-----------( 102      ( 17 Aug 2018 11:16 )             34.1     08-17    120<L>  |  85<L>  |  7<L>  ----------------------------<  103<H>  4.2   |  21  |  0.7    Ca    7.8<L>      17 Aug 2018 11:16  Mg     1.7         TPro  6.1  /  Alb  3.0<L>  /  TBili  3.5<H>  /  DBili  x   /  AST  50<H>  /  ALT  20  /  AlkPhos  656<H>      PT/INR - ( 17 Aug 2018 08:58 )   PT: 22.50 sec;   INR: 2.05 ratio         PTT - ( 17 Aug 2018 08:58 )  PTT:37.7 sec  Urinalysis Basic - ( 16 Aug 2018 19:25 )    Color: Yellow / Appearance: Clear / S.020 / pH: x  Gluc: x / Ketone: Negative  / Bili: Negative / Urobili: 0.2 mg/dL   Blood: x / Protein: Trace mg/dL / Nitrite: Negative   Leuk Esterase: Large / RBC: >50 /HPF / WBC >50 /HPF   Sq Epi: x / Non Sq Epi: x / Bacteria: Few    RADIOLOGY & ADDITIONAL TESTS:  < from: US Abdomen Limited (18 @ 20:43) >  IMPRESSION:    1.  Cirrhotic liver.    2.  Cholelithiasis and sludgewith nonspecific gallbladder wall   thickening.    3.  Small volume ascites.    < from: CT Abdomen and Pelvis w/ IV Cont (18 @ 19:17) >  IMPRESSION:   1.  Cirrhosis with evidence of portal hypertension including small volume   abdominopelvic ascites.    2.  Mild left intrahepatic biliary ductal dilation. Correlate with LFTs.    3.  Gallbladder sludge and/or cholelithiasis.    < from: Xray Chest 1 View-PORTABLE IMMEDIATE (18 @ 18:16) >  Impression:      No radiographic evidence of acute cardiopulmonary disease.    MEDICATIONS  (STANDING):  enalapril 5 milliGRAM(s) Oral daily  enoxaparin Injectable 40 milliGRAM(s) SubCutaneous daily  fentaNYL   Patch  12 MICROgram(s)/Hr 1 Patch Transdermal every 72 hours  meropenem  IVPB      meropenem  IVPB 500 milliGRAM(s) IV Intermittent every 8 hours  metoprolol tartrate 25 milliGRAM(s) Oral two times a day  mupirocin 2% Ointment 1 Application(s) Topical two times a day  nortriptyline 10 milliGRAM(s) Oral at bedtime  pantoprazole    Tablet 40 milliGRAM(s) Oral before breakfast  senna 1 Tablet(s) Oral at bedtime  sodium chloride 0.9%. 1000 milliLiter(s) (75 mL/Hr) IV Continuous <Continuous>    MEDICATIONS  (PRN):  zolpidem 5 milliGRAM(s) Oral at bedtime PRN Insomnia  zolpidem 5 milliGRAM(s) Oral at bedtime PRN Insomnia

## 2018-08-17 NOTE — PROGRESS NOTE ADULT - SUBJECTIVE AND OBJECTIVE BOX
85 year old gentleman with chronic indwelling enrique with hx prostate cancer is being followed as OP by [facility] MEdical HOme Visit PRogram; he has been treated with oral cipro/flagyl for suspected biliary sepsis but all parameters worsening with increasing wbc and dec serum Na; patient was very reluctant to come to hospital but eventually agreed after conferring with PCP Dr. Maday Baeza.    In ED Na of 119 (last month was NL), wbc of 16 (baseline of 7); abdominal US and abdominal CT both demonstrate cirrhosis with gallstones and sludge, small amount of ascitis and CT describing portal HTN with low volume ascitis; admitted and started on IV NS, IV cipro and IV metronidazole    This AM he is alert and conversant.  He discussed visit by surgical service.    HE IS MAKING CLEAR TO ME THAT HE DOES NOT WANT EXTRAORDINARY MEASURES TO PROLONG LIFE; THIS MAY ALSO INCLUDE SURGERIES BUT IS NOT OPPOSED TO SOME INTERVENTIONAL PROCEDURES; DISCUSSED THAT WE WILL SEE HOW HE CLINICALLY PROGRESSES AND TAKE INTO ACCOUNT RECOMMENDATIONS OF SUBSPECIALISTS.     Denies pain  151/75; HR 73  afebrile, 96.1 F  conversant  no diaphoresis  abd with diffuse soreness with deep palpation  hematuria gross in enrique  (NOTE THAT HE HAD ENRIQUE CHANGE ABOUT 10 DAYS AGO AND SINCE THEN HAS HAD INTERMITTENT HEMATURIA

## 2018-08-18 LAB
ALBUMIN SERPL ELPH-MCNC: 2.9 G/DL — LOW (ref 3.5–5.2)
ALP SERPL-CCNC: 621 U/L — HIGH (ref 30–115)
ALT FLD-CCNC: 20 U/L — SIGNIFICANT CHANGE UP (ref 0–41)
ANION GAP SERPL CALC-SCNC: 13 MMOL/L — SIGNIFICANT CHANGE UP (ref 7–14)
AST SERPL-CCNC: 66 U/L — HIGH (ref 0–41)
BILIRUB SERPL-MCNC: 3.3 MG/DL — HIGH (ref 0.2–1.2)
BUN SERPL-MCNC: 6 MG/DL — LOW (ref 10–20)
C DIFF BY PCR RESULT: NEGATIVE — SIGNIFICANT CHANGE UP
C DIFF TOX GENS STL QL NAA+PROBE: SIGNIFICANT CHANGE UP
CALCIUM SERPL-MCNC: 7.6 MG/DL — LOW (ref 8.5–10.1)
CALCIUM UR-MCNC: 9 MG/DL — SIGNIFICANT CHANGE UP
CHLORIDE SERPL-SCNC: 89 MMOL/L — LOW (ref 98–110)
CO2 SERPL-SCNC: 18 MMOL/L — SIGNIFICANT CHANGE UP (ref 17–32)
CREAT ?TM UR-MCNC: 51 MG/DL — SIGNIFICANT CHANGE UP
CREAT SERPL-MCNC: 0.7 MG/DL — SIGNIFICANT CHANGE UP (ref 0.7–1.5)
GLUCOSE SERPL-MCNC: 70 MG/DL — SIGNIFICANT CHANGE UP (ref 70–99)
HAV IGM SER-ACNC: SIGNIFICANT CHANGE UP
HBV CORE IGM SER-ACNC: SIGNIFICANT CHANGE UP
HBV SURFACE AG SER-ACNC: SIGNIFICANT CHANGE UP
HCT VFR BLD CALC: 35.1 % — LOW (ref 42–52)
HCV AB S/CO SERPL IA: 0.23 S/CO — SIGNIFICANT CHANGE UP
HCV AB SERPL-IMP: SIGNIFICANT CHANGE UP
HGB BLD-MCNC: 12.3 G/DL — LOW (ref 14–18)
MAGNESIUM SERPL-MCNC: 1.7 MG/DL — LOW (ref 1.8–2.4)
MAGNESIUM UR-MCNC: 6.5 MG/DL — SIGNIFICANT CHANGE UP
MCHC RBC-ENTMCNC: 28.2 PG — SIGNIFICANT CHANGE UP (ref 27–31)
MCHC RBC-ENTMCNC: 35 G/DL — SIGNIFICANT CHANGE UP (ref 32–37)
MCV RBC AUTO: 80.5 FL — SIGNIFICANT CHANGE UP (ref 80–94)
NRBC # BLD: 0 /100 WBCS — SIGNIFICANT CHANGE UP (ref 0–0)
OSMOLALITY UR: 251 MOS/KG — SIGNIFICANT CHANGE UP (ref 50–1400)
PHOSPHATE 24H UR-MCNC: 36 MG/DL — SIGNIFICANT CHANGE UP
PLATELET # BLD AUTO: 91 K/UL — LOW (ref 130–400)
POTASSIUM SERPL-MCNC: 5.4 MMOL/L — HIGH (ref 3.5–5)
POTASSIUM SERPL-SCNC: 5.4 MMOL/L — HIGH (ref 3.5–5)
POTASSIUM UR-SCNC: 15 MMOL/L — SIGNIFICANT CHANGE UP
PROT SERPL-MCNC: 6 G/DL — SIGNIFICANT CHANGE UP (ref 6–8)
RBC # BLD: 4.36 M/UL — LOW (ref 4.7–6.1)
RBC # FLD: 16.3 % — HIGH (ref 11.5–14.5)
SODIUM SERPL-SCNC: 120 MMOL/L — LOW (ref 135–146)
SODIUM UR-SCNC: <20 MMOL/L — SIGNIFICANT CHANGE UP
WBC # BLD: 10.5 K/UL — SIGNIFICANT CHANGE UP (ref 4.8–10.8)
WBC # FLD AUTO: 10.5 K/UL — SIGNIFICANT CHANGE UP (ref 4.8–10.8)

## 2018-08-18 PROCEDURE — 99232 SBSQ HOSP IP/OBS MODERATE 35: CPT

## 2018-08-18 RX ORDER — HEPARIN SODIUM 5000 [USP'U]/ML
5000 INJECTION INTRAVENOUS; SUBCUTANEOUS EVERY 12 HOURS
Qty: 0 | Refills: 0 | Status: DISCONTINUED | OUTPATIENT
Start: 2018-08-18 | End: 2018-08-19

## 2018-08-18 RX ORDER — SODIUM CHLORIDE 9 MG/ML
1000 INJECTION INTRAMUSCULAR; INTRAVENOUS; SUBCUTANEOUS
Qty: 0 | Refills: 0 | Status: DISCONTINUED | OUTPATIENT
Start: 2018-08-18 | End: 2018-08-19

## 2018-08-18 RX ORDER — LACTOBACILLUS ACIDOPHILUS 100MM CELL
1 CAPSULE ORAL
Qty: 0 | Refills: 0 | Status: DISCONTINUED | OUTPATIENT
Start: 2018-08-18 | End: 2018-08-20

## 2018-08-18 RX ORDER — MAGNESIUM SULFATE 500 MG/ML
1 VIAL (ML) INJECTION ONCE
Qty: 0 | Refills: 0 | Status: COMPLETED | OUTPATIENT
Start: 2018-08-18 | End: 2018-08-18

## 2018-08-18 RX ADMIN — MUPIROCIN 1 APPLICATION(S): 20 OINTMENT TOPICAL at 17:37

## 2018-08-18 RX ADMIN — MEROPENEM 100 MILLIGRAM(S): 1 INJECTION INTRAVENOUS at 21:56

## 2018-08-18 RX ADMIN — Medication 1 TABLET(S): at 17:37

## 2018-08-18 RX ADMIN — ZOLPIDEM TARTRATE 5 MILLIGRAM(S): 10 TABLET ORAL at 21:55

## 2018-08-18 RX ADMIN — Medication 5 MILLIGRAM(S): at 05:29

## 2018-08-18 RX ADMIN — MEROPENEM 100 MILLIGRAM(S): 1 INJECTION INTRAVENOUS at 05:30

## 2018-08-18 RX ADMIN — NORTRIPTYLINE HYDROCHLORIDE 10 MILLIGRAM(S): 10 CAPSULE ORAL at 21:55

## 2018-08-18 RX ADMIN — Medication 25 MILLIGRAM(S): at 05:29

## 2018-08-18 RX ADMIN — Medication 100 GRAM(S): at 16:25

## 2018-08-18 RX ADMIN — MUPIROCIN 1 APPLICATION(S): 20 OINTMENT TOPICAL at 05:30

## 2018-08-18 RX ADMIN — SODIUM CHLORIDE 75 MILLILITER(S): 9 INJECTION INTRAMUSCULAR; INTRAVENOUS; SUBCUTANEOUS at 06:07

## 2018-08-18 RX ADMIN — Medication 81 MILLIGRAM(S): at 13:20

## 2018-08-18 RX ADMIN — SODIUM CHLORIDE 50 MILLILITER(S): 9 INJECTION INTRAMUSCULAR; INTRAVENOUS; SUBCUTANEOUS at 11:48

## 2018-08-18 RX ADMIN — PANTOPRAZOLE SODIUM 40 MILLIGRAM(S): 20 TABLET, DELAYED RELEASE ORAL at 06:03

## 2018-08-18 RX ADMIN — HEPARIN SODIUM 5000 UNIT(S): 5000 INJECTION INTRAVENOUS; SUBCUTANEOUS at 17:37

## 2018-08-18 RX ADMIN — MEROPENEM 100 MILLIGRAM(S): 1 INJECTION INTRAVENOUS at 13:22

## 2018-08-18 RX ADMIN — Medication 25 MILLIGRAM(S): at 17:37

## 2018-08-18 NOTE — PROGRESS NOTE ADULT - SUBJECTIVE AND OBJECTIVE BOX
CURRENT COMPLAINTS:  Admitted with elevated WBC and distension.  Pt today says he is less distended, but still looks distended.  He says he does not have pain and wants to go home.  He was placed on meropenem on admission empirically.  He denies abdominal pain, nausea, vomiting but admits to loose stool since he has been on a liquid diet (five days for gallstones ??).  On radiologic imaging the patient has a NL CBD, a nodular liver, L IHDD, GB stones and sludge and small volume ascites.    PAST MEDICAL & SURGICAL HISTORY:  Osteomyelitis  Pneumonia  Arthritis  Prostate cancer  Sleep apnea  Diverticulosis  HTN (hypertension)  Diabetes  H/O nephrolithotomy with removal of calculi  H/O laminectomy      ALLERGIES  No Known Allergies      MEDICATIONS  aspirin  chewable 81 milliGRAM(s) Oral daily  enalapril 5 milliGRAM(s) Oral daily  enoxaparin Injectable 40 milliGRAM(s) SubCutaneous daily  fentaNYL   Patch  12 MICROgram(s)/Hr 1 Patch Transdermal every 72 hours  meropenem  IVPB      meropenem  IVPB 500 milliGRAM(s) IV Intermittent every 8 hours  metoprolol tartrate 25 milliGRAM(s) Oral two times a day  mupirocin 2% Ointment 1 Application(s) Topical two times a day  nortriptyline 10 milliGRAM(s) Oral at bedtime  pantoprazole    Tablet 40 milliGRAM(s) Oral before breakfast  senna 1 Tablet(s) Oral at bedtime  sodium chloride 0.9%. 1000 milliLiter(s) IV Continuous <Continuous>  zolpidem 5 milliGRAM(s) Oral at bedtime PRN  zolpidem 5 milliGRAM(s) Oral at bedtime PRN      Physical Exam:  Vital Signs Last 24 Hrs  T(C): 35.8 (18 Aug 2018 06:00), Max: 36.2 (17 Aug 2018 14:32)  T(F): 96.5 (18 Aug 2018 06:00), Max: 97.2 (17 Aug 2018 14:32)  HR: 69 (18 Aug 2018 06:00) (69 - 78)  BP: 139/64 (18 Aug 2018 06:00) (139/64 - 166/68)  BP(mean): --  RR: 18 (18 Aug 2018 06:00) (18 - 18)  SpO2: --  HEENT:          Anicteric, neck supple, no JVD.  Chest:            No rales, rhonchi or wheezes.  Full breath sounds.  Cardiac:         RRR No S3/S4  Abdomen:     Soft, non-tender, non-distended, normoactive bowel sounds.  Extremities:  No edema  Neurologic:   Alert and oriented x 3.    Laboratories:    CBC Full  -  ( 17 Aug 2018 11:16 )  WBC Count : 13.56 K/uL  Hemoglobin : 11.7 g/dL  Hematocrit : 34.1 %  Platelet Count - Automated : 102 K/uL  Mean Cell Volume : 82.2 fL  Mean Cell Hemoglobin : 28.2 pg  Mean Cell Hemoglobin Concentration : 34.3 g/dL  Auto Neutrophil # : x  Auto Lymphocyte # : x  Auto Monocyte # : x  Auto Eosinophil # : x  Auto Basophil # : x  Auto Neutrophil % : x  Auto Lymphocyte % : x  Auto Monocyte % : x  Auto Eosinophil % : x  Auto Basophil % : x    08-17    120<L>  |  85<L>  |  7<L>  ----------------------------<  103<H>  4.2   |  21  |  0.7    Ca    7.8<L>      17 Aug 2018 11:16  Mg     1.7     08-17    TPro  6.1  /  Alb  3.0<L>  /  TBili  3.5<H>  /  DBili  x   /  AST  50<H>  /  ALT  20  /  AlkPhos  656<H>  08-17    PT/INR - ( 17 Aug 2018 08:58 )   PT: 22.50 sec;   INR: 2.05 ratio         PTT - ( 17 Aug 2018 08:58 )  PTT:37.7 sec      Radiologic Imaging:

## 2018-08-18 NOTE — PROGRESS NOTE ADULT - ATTENDING COMMENTS
Patient seen and examined with surgery team on rounds and discussed management plans. Patient now on isolation with positive urine for staph, patient denies any abdominal pain or symptoms and does not want any surgical intervention. Patient explained. No further surgical followup.

## 2018-08-18 NOTE — PROGRESS NOTE ADULT - SUBJECTIVE AND OBJECTIVE BOX
85 year old gentleman with chronic indwelling enrique with hx prostate cancer is being followed as OP by [facility] MEdical HOme Visit PRogram; he has been treated with oral cipro/flagyl for suspected biliary sepsis but all parameters worsening with increasing wbc and dec serum Na; patient was very reluctant to come to hospital but eventually agreed after conferring with PCP Dr. Maday Baeza.  In ED Na of 119 (last month was NL), wbc of 16 (baseline of 7); abdominal US and abdominal CT both demonstrate cirrhosis with gallstones and sludge, small amount of ascitics and CT describing portal HTN with low volume ascitics admitted and started on IV NS, IV Cipro and IV metronidazole.  Case d/w Dr Baeza, will start Meropenem, consult ID, surgery and GI.  Today had a loose stool, wants to go home, denies abdominal pain,  wife by the bedside.     Vital Signs Last 24 Hrs  T(C): 35.8 (18 Aug 2018 06:00), Max: 36.2 (17 Aug 2018 14:32)  T(F): 96.5 (18 Aug 2018 06:00), Max: 97.2 (17 Aug 2018 14:32)  HR: 69 (18 Aug 2018 06:00) (69 - 78)  BP: 139/64 (18 Aug 2018 06:00) (139/64 - 166/68)  BP(mean): --  RR: 18 (18 Aug 2018 06:00) (18 - 18)    PHYSICAL EXAM:  GENERAL: NAD, wheelchair bound   HEAD:  Atraumatic, Normocephalic  NECK: Supple, No JVD, Normal thyroid  NERVOUS SYSTEM:  Alert & Oriented X3, Good concentration; paraplegic   CHEST/LUNG: decreased BS at bases; No rales, rhonchi, wheezing, or rubs  HEART: Regular rate and rhythm; No murmurs, rubs, or gallops  ABDOMEN: Soft, Nontender, distended with positive BS , Enrique cath with dark urine   EXTREMITIES:  2+ Peripheral Pulses, No clubbing, cyanosis, or edema  LYMPH: No lymphadenopathy noted  SKIN: pt has small open wound on his back , w/o signs of infection ( since )       LABS:                                            12.3   10.50 )-----------( 91       ( 18 Aug 2018 07:40 )             35.1   08-17    120<L>  |  85<L>  |  7<L>  ----------------------------<  103<H>  4.2   |  21  |  0.7    Ca    7.8<L>      17 Aug 2018 11:16  Mg     1.7         TPro  6.1  /  Alb  3.0<L>  /  TBili  3.5<H>  /  DBili  x   /  AST  50<H>  /  ALT  20  /  AlkPhos  656<H>        PT/INR - ( 17 Aug 2018 08:58 )   PT: 22.50 sec;   INR: 2.05 ratio         PTT - ( 17 Aug 2018 08:58 )  PTT:37.7 sec  Urinalysis Basic - ( 16 Aug 2018 19:25 )    Color: Yellow / Appearance: Clear / S.020 / pH: x  Gluc: x / Ketone: Negative  / Bili: Negative / Urobili: 0.2 mg/dL   Blood: x / Protein: Trace mg/dL / Nitrite: Negative   Leuk Esterase: Large / RBC: >50 /HPF / WBC >50 /HPF   Sq Epi: x / Non Sq Epi: x / Bacteria: Few    RADIOLOGY & ADDITIONAL TESTS:  < from: US Abdomen Limited (18 @ 20:43) >  IMPRESSION:    1.  Cirrhotic liver.    2.  Cholelithiasis and sludge with nonspecific gallbladder wall   thickening.    3.  Small volume ascites.    < from: CT Abdomen and Pelvis w/ IV Cont (18 @ 19:17) >  IMPRESSION:   1.  Cirrhosis with evidence of portal hypertension including small volume   abdominopelvic ascites.    2.  Mild left intrahepatic biliary ductal dilation. Correlate with LFTs.    3.  Gallbladder sludge and/or cholelithiasis.    < from: Xray Chest 1 View-PORTABLE IMMEDIATE (18 @ 18:16) >  Impression:      No radiographic evidence of acute cardiopulmonary disease.    MEDICATIONS  (STANDING):  aspirin  chewable 81 milliGRAM(s) Oral daily  enalapril 5 milliGRAM(s) Oral daily  enoxaparin Injectable 40 milliGRAM(s) SubCutaneous daily  fentaNYL   Patch  12 MICROgram(s)/Hr 1 Patch Transdermal every 72 hours  meropenem  IVPB      meropenem  IVPB 500 milliGRAM(s) IV Intermittent every 8 hours  metoprolol tartrate 25 milliGRAM(s) Oral two times a day  mupirocin 2% Ointment 1 Application(s) Topical two times a day  nortriptyline 10 milliGRAM(s) Oral at bedtime  pantoprazole    Tablet 40 milliGRAM(s) Oral before breakfast  senna 1 Tablet(s) Oral at bedtime  sodium chloride 0.9%. 1000 milliLiter(s) (75 mL/Hr) IV Continuous <Continuous>    MEDICATIONS  (PRN):  zolpidem 5 milliGRAM(s) Oral at bedtime PRN Insomnia  zolpidem 5 milliGRAM(s) Oral at bedtime PRN Insomnia

## 2018-08-18 NOTE — CONSULT NOTE ADULT - SUBJECTIVE AND OBJECTIVE BOX
Patient is a 85y old  Male who presents with a chief complaint of elevated WBC despite antibiotics (16 Aug 2018 22:30)      INTERVAL HPI/OVERNIGHT EVENTS:  T(C): 37.1 (08-18-18 @ 14:20), Max: 37.1 (08-18-18 @ 14:20)  HR: 70 (08-18-18 @ 14:20) (69 - 72)  BP: 161/62 (08-18-18 @ 14:20) (139/64 - 161/62)  RR: 18 (08-18-18 @ 14:20) (18 - 18)  SpO2: --  Wt(kg): --  I&O's Summary    17 Aug 2018 07:01  -  18 Aug 2018 07:00  --------------------------------------------------------  IN: 900 mL / OUT: 620 mL / NET: 280 mL    18 Aug 2018 07:01  -  18 Aug 2018 20:33  --------------------------------------------------------  IN: 1000 mL / OUT: 1300 mL / NET: -300 mL        PAST MEDICAL & SURGICAL HISTORY:  Osteomyelitis  Pneumonia  Arthritis  Prostate cancer  Sleep apnea  Diverticulosis  HTN (hypertension)  Diabetes  H/O nephrolithotomy with removal of calculi  H/O laminectomy      SOCIAL HISTORY  Alcohol:  Tobacco:  Illicit substance use:      FAMILY HISTORY:      LABS:                        12.3   10.50 )-----------( 91       ( 18 Aug 2018 07:40 )             35.1     08-18    120<L>  |  89<L>  |  6<L>  ----------------------------<  70  5.4<H>   |  18  |  0.7    Ca    7.6<L>      18 Aug 2018 07:40  Mg     1.7     08-18    TPro  6.0  /  Alb  2.9<L>  /  TBili  3.3<H>  /  DBili  x   /  AST  66<H>  /  ALT  20  /  AlkPhos  621<H>  08-18    PT/INR - ( 17 Aug 2018 08:58 )   PT: 22.50 sec;   INR: 2.05 ratio         PTT - ( 17 Aug 2018 08:58 )  PTT:37.7 sec    CAPILLARY BLOOD GLUCOSE  114 (18 Aug 2018 16:28)  92 (18 Aug 2018 11:30)  96 (17 Aug 2018 21:01)                MEDICATIONS  (STANDING):  aspirin  chewable 81 milliGRAM(s) Oral daily  enalapril 5 milliGRAM(s) Oral daily  fentaNYL   Patch  12 MICROgram(s)/Hr 1 Patch Transdermal every 72 hours  heparin  Injectable 5000 Unit(s) SubCutaneous every 12 hours  lactobacillus acidophilus 1 Tablet(s) Oral two times a day with meals  meropenem  IVPB      meropenem  IVPB 500 milliGRAM(s) IV Intermittent every 8 hours  metoprolol tartrate 25 milliGRAM(s) Oral two times a day  mupirocin 2% Ointment 1 Application(s) Topical two times a day  nortriptyline 10 milliGRAM(s) Oral at bedtime  pantoprazole    Tablet 40 milliGRAM(s) Oral before breakfast  senna 1 Tablet(s) Oral at bedtime  sodium chloride 0.9%. 1000 milliLiter(s) (50 mL/Hr) IV Continuous <Continuous>    MEDICATIONS  (PRN):  zolpidem 5 milliGRAM(s) Oral at bedtime PRN Insomnia  zolpidem 5 milliGRAM(s) Oral at bedtime PRN Insomnia      REVIEW OF SYSTEMS:  CONSTITUTIONAL: No fever, weight loss, or fatigue  EYES: No eye pain, visual disturbances, or discharge  ENMT:  No difficulty hearing, tinnitus, vertigo; No sinus or throat pain  NECK: No pain or stiffness  RESPIRATORY: No cough, wheezing, chills or hemoptysis; No shortness of breath  CARDIOVASCULAR: No chest pain, palpitations, dizziness, or leg swelling  GASTROINTESTINAL: No abdominal or epigastric pain. No nausea, vomiting, or hematemesis; No diarrhea or constipation. No melena or hematochezia.  GENITOURINARY: No dysuria, frequency, hematuria, or incontinence  NEUROLOGICAL: No headaches, memory loss, loss of strength, numbness, or tremors  SKIN: No itching, burning, rashes, or lesions   LYMPH NODES: No enlarged glands  ENDOCRINE: No heat or cold intolerance; No hair loss  MUSCULOSKELETAL: No joint pain or swelling; No muscle, back, or extremity pain  PSYCHIATRIC: No depression, anxiety, mood swings, or difficulty sleeping  HEME/LYMPH: No easy bruising, or bleeding gums  ALLERY AND IMMUNOLOGIC: No hives or eczema    RADIOLOGY & ADDITIONAL TESTS:    Imaging Personally Reviewed:  [ ] YES  [ ] NO    Consultant(s) Notes Reviewed:  [ ] YES  [ ] NO    PHYSICAL EXAM:  GENERAL: NAD, well-groomed, well-developed  HEAD:  Atraumatic, Normocephalic  EYES: EOMI, PERRLA, conjunctiva and sclera clear  ENMT: No tonsillar erythema, exudates, or enlargement; Moist mucous membranes, Good dentition, No lesions  NECK: Supple, No JVD, Normal thyroid  NERVOUS SYSTEM:  Alert & Oriented X3, Good concentration; Motor Strength 5/5 B/L upper and lower extremities; DTRs 2+ intact and symmetric  CHEST/LUNG: Clear to percussion bilaterally; No rales, rhonchi, wheezing, or rubs  HEART: Regular rate and rhythm; No murmurs, rubs, or gallops  ABDOMEN: Soft, Nontender, Nondistended; Bowel sounds present  EXTREMITIES:  2+ Peripheral Pulses, No clubbing, cyanosis, or edema  LYMPH: No lymphadenopathy noted  SKIN: No rashes or lesions    Care Discussed with Consultants/Other Providers [ ] YES  [ ] NO Patient is a 85y old  Male who  is currently being treated for known cholelithiasis with cipro and flagyl, now presents to the ER for evaluation  of  elevated WBC despite antibiotics (16 Aug 2018 22:30)8He has no fever or chills, started on Meropenem and the ID consult requested to assist with further antibiotic management.      REVIEW OF SYSTEMS: Total of twelve systems have been reviewed with patient and found to be negative unless mentioned in HPI        PAST MEDICAL & SURGICAL HISTORY:  Osteomyelitis  Pneumonia  Arthritis  Prostate cancer  Sleep apnea  Diverticulosis  HTN (hypertension)  Diabetes  H/O nephrolithotomy with removal of calculi  H/O laminectomy      SOCIAL HISTORY  Alcohol: Does not drink  Tobacco: Does not smoke  Illicit substance use: None      FAMILY HISTORY: Non contributory to the present illness      ALLERGIES: NKDA      T(C): 37.1 (08-18-18 @ 14:20), Max: 37.1 (08-18-18 @ 14:20)  HR: 70 (08-18-18 @ 14:20) (69 - 72)  BP: 161/62 (08-18-18 @ 14:20) (139/64 - 161/62)  RR: 18 (08-18-18 @ 14:20) (18 - 18)  SpO2: --  Wt(kg): --  I&O's Summary      PHYSICAL EXAM:  GENERAL: Not in distress  CVSL s1 and s2 present  RESP: Air entry B/L  MIKE Abdomen nontender  EXT:  NO pedal edema  CNS: Awake and  alert        LABS:                        12.3   10.50 )-----------( 91       ( 18 Aug 2018 07:40 )             35.1         08-18    120<L>  |  89<L>  |  6<L>  ----------------------------<  70  5.4<H>   |  18  |  0.7    Ca    7.6<L>      18 Aug 2018 07:40  Mg     1.7     08-18    TPro  6.0  /  Alb  2.9<L>  /  TBili  3.3<H>  /  DBili  x   /  AST  66<H>  /  ALT  20  /  AlkPhos  621<H>  08-18    PT/INR - ( 17 Aug 2018 08:58 )   PT: 22.50 sec;   INR: 2.05 ratio           MEDICATIONS  (STANDING):  aspirin  chewable 81 milliGRAM(s) Oral daily  enalapril 5 milliGRAM(s) Oral daily  fentaNYL   Patch  12 MICROgram(s)/Hr 1 Patch Transdermal every 72 hours  heparin  Injectable 5000 Unit(s) SubCutaneous every 12 hours  lactobacillus acidophilus 1 Tablet(s) Oral two times a day with meals  meropenem  IVPB      meropenem  IVPB 500 milliGRAM(s) IV Intermittent every 8 hours  metoprolol tartrate 25 milliGRAM(s) Oral two times a day  mupirocin 2% Ointment 1 Application(s) Topical two times a day  nortriptyline 10 milliGRAM(s) Oral at bedtime  pantoprazole    Tablet 40 milliGRAM(s) Oral before breakfast  senna 1 Tablet(s) Oral at bedtime  sodium chloride 0.9%. 1000 milliLiter(s) (50 mL/Hr) IV Continuous <Continuous>    MEDICATIONS  (PRN):  zolpidem 5 milliGRAM(s) Oral at bedtime PRN Insomnia  zolpidem 5 milliGRAM(s) Oral at bedtime PRN Insomnia        RADIOLOGY & ADDITIONAL TESTS:    < from: US Abdomen Limited (08.16.18 @ 20:43) >  1.  Cirrhotic liver.    2.  Cholelithiasis and sludgewith nonspecific gallbladder wall   thickening.    3.  Small volume ascites.    < end of copied text >    < from: CT Abdomen and Pelvis w/ IV Cont (08.16.18 @ 19:17) >  1.  Cirrhosis with evidence of portal hypertension including small volume   abdominopelvic ascites.    2.  Mild left intrahepatic biliary ductal dilation. Correlate with LFTs.    3.  Gallbladder sludge and/or cholelithiasis.    < end of copied text >

## 2018-08-18 NOTE — PROGRESS NOTE ADULT - SUBJECTIVE AND OBJECTIVE BOX
GENERAL SURGERY PROGRESS NOTE    Patient: BREANN KRAFT , 85y (33)Male   MRN: [id number]  Location: [place] [address]  Visit: 18 Inpatient  Date: 18 @ 09:53      Procedure/Dx/Injuries: cholelithiasis with elevated LFTs    Events of past 24 hours: no acute events o/n, patient tolerating diet, no complaint of abdominal pain. patient states his abd distention is normal and has been present for many years. passing flatus/ +bowel movents.     PAST MEDICAL & SURGICAL HISTORY:  Osteomyelitis  Pneumonia  Arthritis  Prostate cancer  Sleep apnea  Diverticulosis  HTN (hypertension)  Diabetes  H/O nephrolithotomy with removal of calculi  H/O laminectomy    Vitals: T(F): 96.5 (18 @ 06:00), Max: 97.2 (18 @ 14:32)  HR: 69 (18 @ 06:00)  BP: 139/64 (18 @ 06:00)  RR: 18 (18 @ 06:00)  SpO2: --    Diet, DASH/TLC:   Sodium & Cholesterol Restricted  Consistent Carbohydrate No Snacks  1200mL Fluid Restriction ([id number])    Fluids: sodium chloride 0.9%.: Solution, 1000 milliLiter(s) infuse at 75 mL/Hr  Provider's Contact #: [phone]    I & O's:    18 @ 07:01  -  18 @ 07:00  --------------------------------------------------------  IN:    sodium chloride 0.9%: 900 mL  Total IN: 900 mL    OUT:    Indwelling Catheter - Urethral: 620 mL  Total OUT: 620 mL    Total NET: 280 mL    Bowel Movement: : [x] YES [] NO  Flatus: : [x] YES [] NO    PHYSICAL EXAM  GEN: NAD.  ABD: Abd is soft but distended and tympanitic, no tenderness, palpable masses or hernias, no organomegaly. +BS    MEDICATIONS  (STANDING):  aspirin  chewable 81 milliGRAM(s) Oral daily  enalapril 5 milliGRAM(s) Oral daily  enoxaparin Injectable 40 milliGRAM(s) SubCutaneous daily  fentaNYL   Patch  12 MICROgram(s)/Hr 1 Patch Transdermal every 72 hours  meropenem  IVPB      meropenem  IVPB 500 milliGRAM(s) IV Intermittent every 8 hours  metoprolol tartrate 25 milliGRAM(s) Oral two times a day  mupirocin 2% Ointment 1 Application(s) Topical two times a day  nortriptyline 10 milliGRAM(s) Oral at bedtime  pantoprazole    Tablet 40 milliGRAM(s) Oral before breakfast  senna 1 Tablet(s) Oral at bedtime  sodium chloride 0.9%. 1000 milliLiter(s) (75 mL/Hr) IV Continuous <Continuous>    MEDICATIONS  (PRN):  zolpidem 5 milliGRAM(s) Oral at bedtime PRN Insomnia  zolpidem 5 milliGRAM(s) Oral at bedtime PRN Insomnia    DVT PROPHYLAXIS: [x] YES [] NO   GI PROPHYLAXIS: [x] YES [] NO   ANTICOAGULATION: [] YES [x] NO   ANTIBIOTICS: [x] YES [] NO meropenem  IVPB    meropenem  IVPB 500 milliGRAM(s)    LAB/STUDIES:  CAPILLARY BLOOD GLUCOSE  96 (17 Aug 2018 21:01)  97 (17 Aug 2018 16:00)  108 (17 Aug 2018 11:10)               12.3   10.50 )-----------( 91       ( 18 Aug 2018 07:40 )             35.1         120<L>  |  85<L>  |  7<L>  ----------------------------<  103<H>  4.2   |  21  |  0.7    Ca    7.8<L>      17 Aug 2018 11:16  Mg     1.7         TPro  6.1  /  Alb  3.0<L>  /  TBili  3.5<H>  /  DBili  x   /  AST  50<H>  /  ALT  20  /  AlkPhos  656<H>                 6.1  | 3.5  | 50       ------------------[656     ( 17 Aug 2018 11:16 )  3.0  | x    | 20          PT/INR - ( 17 Aug 2018 08:58 )   PT: 22.50 sec;   INR: 2.05 ratio      PTT - ( 17 Aug 2018 08:58 )  PTT:37.7 sec  Urinalysis Basic - ( 16 Aug 2018 19:25 )    Color: Yellow / Appearance: Clear / S.020 / pH: x  Gluc: x / Ketone: Negative  / Bili: Negative / Urobili: 0.2 mg/dL   Blood: x / Protein: Trace mg/dL / Nitrite: Negative   Leuk Esterase: Large / RBC: >50 /HPF / WBC >50 /HPF   Sq Epi: x / Non Sq Epi: x / Bacteria: Few    Culture - Urine (collected 16 Aug 2018 19:25)  Source: .Urine Clean Catch (Midstream)  Preliminary Report (18 Aug 2018 08:53):    >100,000 CFU/ml Staphylococcus aureus GENERAL SURGERY PROGRESS NOTE    Patient: BREANN KRAFT , 85y (33)Male   MRN: [id number]  Location: [place] [address]  Visit: 18 Inpatient  Date: 18 @ 09:53      Procedure/Dx/Injuries: cholelithiasis with elevated LFTs    Events of past 24 hours: no acute events o/n, patient tolerating diet, no complaint of abdominal pain. patient states his abd distention is normal and has been present for many years. passing flatus/ +bowel movents.     PAST MEDICAL & SURGICAL HISTORY:  Osteomyelitis  Pneumonia  Arthritis  Prostate cancer  Sleep apnea  Diverticulosis  HTN (hypertension)  Diabetes  H/O nephrolithotomy with removal of calculi  H/O laminectomy    Vitals: T(F): 96.5 (18 @ 06:00), Max: 97.2 (18 @ 14:32)  HR: 69 (18 @ 06:00)  BP: 139/64 (18 @ 06:00)  RR: 18 (18 @ 06:00)    Diet, DASH/TLC:   Sodium & Cholesterol Restricted  Consistent Carbohydrate No Snacks  1200mL Fluid Restriction ([id number])    Fluids: sodium chloride 0.9%.: Solution, 1000 milliLiter(s) infuse at 75 mL/Hr  Provider's Contact #: [phone]    I & O's:    18 @ 07:01  -  18 @ 07:00  --------------------------------------------------------  IN:    sodium chloride 0.9%: 900 mL  Total IN: 900 mL    OUT:    Indwelling Catheter - Urethral: 620 mL  Total OUT: 620 mL    Total NET: 280 mL    Bowel Movement: : [x] YES [] NO  Flatus: : [x] YES [] NO    PHYSICAL EXAM  GEN: NAD.  ABD: Abd is soft but distended and tympanitic, no tenderness, palpable masses or hernias, no organomegaly. +BS    MEDICATIONS  (STANDING):  aspirin  chewable 81 milliGRAM(s) Oral daily  enalapril 5 milliGRAM(s) Oral daily  enoxaparin Injectable 40 milliGRAM(s) SubCutaneous daily  fentaNYL   Patch  12 MICROgram(s)/Hr 1 Patch Transdermal every 72 hours  meropenem  IVPB      meropenem  IVPB 500 milliGRAM(s) IV Intermittent every 8 hours  metoprolol tartrate 25 milliGRAM(s) Oral two times a day  mupirocin 2% Ointment 1 Application(s) Topical two times a day  nortriptyline 10 milliGRAM(s) Oral at bedtime  pantoprazole    Tablet 40 milliGRAM(s) Oral before breakfast  senna 1 Tablet(s) Oral at bedtime  sodium chloride 0.9%. 1000 milliLiter(s) (75 mL/Hr) IV Continuous <Continuous>    MEDICATIONS  (PRN):  zolpidem 5 milliGRAM(s) Oral at bedtime PRN Insomnia  zolpidem 5 milliGRAM(s) Oral at bedtime PRN Insomnia    DVT PROPHYLAXIS: [x] YES [] NO   GI PROPHYLAXIS: [x] YES [] NO   ANTICOAGULATION: [] YES [x] NO   ANTIBIOTICS: [x] YES [] NO meropenem  IVPB    meropenem  IVPB 500 milliGRAM(s)    LAB/STUDIES:  CAPILLARY BLOOD GLUCOSE  96 (17 Aug 2018 21:01)  97 (17 Aug 2018 16:00)  108 (17 Aug 2018 11:10)               12.3   10.50 )-----------( 91       ( 18 Aug 2018 07:40 )             35.1         120<L>  |  85<L>  |  7<L>  ----------------------------<  103<H>  4.2   |  21  |  0.7    Ca    7.8<L>      17 Aug 2018 11:16  Mg     1.7         TPro  6.1  /  Alb  3.0<L>  /  TBili  3.5<H>  /  DBili  x   /  AST  50<H>  /  ALT  20  /  AlkPhos  656<H>                 6.1  | 3.5  | 50       ------------------[656     ( 17 Aug 2018 11:16 )  3.0  | x    | 20          PT/INR - ( 17 Aug 2018 08:58 )   PT: 22.50 sec;   INR: 2.05 ratio      PTT - ( 17 Aug 2018 08:58 )  PTT:37.7 sec  Urinalysis Basic - ( 16 Aug 2018 19:25 )    Color: Yellow / Appearance: Clear / S.020 / pH: x  Gluc: x / Ketone: Negative  / Bili: Negative / Urobili: 0.2 mg/dL   Blood: x / Protein: Trace mg/dL / Nitrite: Negative   Leuk Esterase: Large / RBC: >50 /HPF / WBC >50 /HPF   Sq Epi: x / Non Sq Epi: x / Bacteria: Few    Culture - Urine (collected 16 Aug 2018 19:25)  Source: .Urine Clean Catch (Midstream)  Preliminary Report (18 Aug 2018 08:53):    >100,000 CFU/ml Staphylococcus aureus

## 2018-08-19 LAB
ALBUMIN SERPL ELPH-MCNC: 2.9 G/DL — LOW (ref 3.5–5.2)
ALP SERPL-CCNC: 801 U/L — HIGH (ref 30–115)
ALT FLD-CCNC: 23 U/L — SIGNIFICANT CHANGE UP (ref 0–41)
ANION GAP SERPL CALC-SCNC: 14 MMOL/L — SIGNIFICANT CHANGE UP (ref 7–14)
AST SERPL-CCNC: 82 U/L — HIGH (ref 0–41)
BILIRUB SERPL-MCNC: 3.2 MG/DL — HIGH (ref 0.2–1.2)
BUN SERPL-MCNC: 7 MG/DL — LOW (ref 10–20)
CALCIUM SERPL-MCNC: 7.5 MG/DL — LOW (ref 8.5–10.1)
CHLORIDE SERPL-SCNC: 94 MMOL/L — LOW (ref 98–110)
CO2 SERPL-SCNC: 20 MMOL/L — SIGNIFICANT CHANGE UP (ref 17–32)
CREAT SERPL-MCNC: 0.7 MG/DL — SIGNIFICANT CHANGE UP (ref 0.7–1.5)
FERRITIN SERPL-MCNC: 160 NG/ML — SIGNIFICANT CHANGE UP (ref 30–400)
GLUCOSE SERPL-MCNC: 92 MG/DL — SIGNIFICANT CHANGE UP (ref 70–99)
HCT VFR BLD CALC: 35.1 % — LOW (ref 42–52)
HGB BLD-MCNC: 12.2 G/DL — LOW (ref 14–18)
MAGNESIUM SERPL-MCNC: 1.8 MG/DL — SIGNIFICANT CHANGE UP (ref 1.8–2.4)
MCHC RBC-ENTMCNC: 28 PG — SIGNIFICANT CHANGE UP (ref 27–31)
MCHC RBC-ENTMCNC: 34.8 G/DL — SIGNIFICANT CHANGE UP (ref 32–37)
MCV RBC AUTO: 80.7 FL — SIGNIFICANT CHANGE UP (ref 80–94)
NRBC # BLD: 0 /100 WBCS — SIGNIFICANT CHANGE UP (ref 0–0)
PLATELET # BLD AUTO: 65 K/UL — LOW (ref 130–400)
POTASSIUM SERPL-MCNC: 4 MMOL/L — SIGNIFICANT CHANGE UP (ref 3.5–5)
POTASSIUM SERPL-SCNC: 4 MMOL/L — SIGNIFICANT CHANGE UP (ref 3.5–5)
PROT SERPL-MCNC: 5.7 G/DL — LOW (ref 6–8)
RBC # BLD: 4.35 M/UL — LOW (ref 4.7–6.1)
RBC # FLD: 16.6 % — HIGH (ref 11.5–14.5)
SMOOTH MUSCLE AB SER-ACNC: ABNORMAL
SODIUM SERPL-SCNC: 128 MMOL/L — LOW (ref 135–146)
T4 FREE SERPL-MCNC: 2 NG/DL — HIGH (ref 0.9–1.8)
TRANSFERRIN SERPL-MCNC: 133 MG/DL — LOW (ref 200–360)
TSH SERPL-MCNC: 2.21 UIU/ML — SIGNIFICANT CHANGE UP (ref 0.27–4.2)
WBC # BLD: 6.75 K/UL — SIGNIFICANT CHANGE UP (ref 4.8–10.8)
WBC # FLD AUTO: 6.75 K/UL — SIGNIFICANT CHANGE UP (ref 4.8–10.8)

## 2018-08-19 RX ORDER — NYSTATIN CREAM 100000 [USP'U]/G
1 CREAM TOPICAL THREE TIMES A DAY
Qty: 0 | Refills: 0 | Status: DISCONTINUED | OUTPATIENT
Start: 2018-08-19 | End: 2018-08-20

## 2018-08-19 RX ADMIN — NYSTATIN CREAM 1 APPLICATION(S): 100000 CREAM TOPICAL at 21:21

## 2018-08-19 RX ADMIN — MEROPENEM 100 MILLIGRAM(S): 1 INJECTION INTRAVENOUS at 21:20

## 2018-08-19 RX ADMIN — HEPARIN SODIUM 5000 UNIT(S): 5000 INJECTION INTRAVENOUS; SUBCUTANEOUS at 05:39

## 2018-08-19 RX ADMIN — MUPIROCIN 1 APPLICATION(S): 20 OINTMENT TOPICAL at 17:46

## 2018-08-19 RX ADMIN — MEROPENEM 100 MILLIGRAM(S): 1 INJECTION INTRAVENOUS at 13:43

## 2018-08-19 RX ADMIN — NORTRIPTYLINE HYDROCHLORIDE 10 MILLIGRAM(S): 10 CAPSULE ORAL at 21:20

## 2018-08-19 RX ADMIN — PANTOPRAZOLE SODIUM 40 MILLIGRAM(S): 20 TABLET, DELAYED RELEASE ORAL at 06:12

## 2018-08-19 RX ADMIN — Medication 25 MILLIGRAM(S): at 05:40

## 2018-08-19 RX ADMIN — Medication 81 MILLIGRAM(S): at 12:19

## 2018-08-19 RX ADMIN — SENNA PLUS 1 TABLET(S): 8.6 TABLET ORAL at 21:20

## 2018-08-19 RX ADMIN — MUPIROCIN 1 APPLICATION(S): 20 OINTMENT TOPICAL at 05:40

## 2018-08-19 RX ADMIN — NYSTATIN CREAM 1 APPLICATION(S): 100000 CREAM TOPICAL at 18:38

## 2018-08-19 RX ADMIN — MEROPENEM 100 MILLIGRAM(S): 1 INJECTION INTRAVENOUS at 05:40

## 2018-08-19 RX ADMIN — Medication 25 MILLIGRAM(S): at 17:46

## 2018-08-19 RX ADMIN — Medication 1 TABLET(S): at 17:46

## 2018-08-19 RX ADMIN — Medication 5 MILLIGRAM(S): at 05:39

## 2018-08-19 RX ADMIN — Medication 1 TABLET(S): at 08:32

## 2018-08-19 NOTE — PROGRESS NOTE ADULT - PROBLEM SELECTOR PLAN 7
c/w enrique cath from home, monitor urine output

## 2018-08-19 NOTE — PROGRESS NOTE ADULT - PROBLEM SELECTOR PROBLEM 4
Hepatic cirrhosis, unspecified hepatic cirrhosis type, unspecified whether ascites present

## 2018-08-19 NOTE — PROGRESS NOTE ADULT - PROBLEM SELECTOR PLAN 2
concur with current plan; cont IV AB; appreciate surgical f/maral; concur with need for GI eval and daily LFTs ( benefit of ERCP?)
on  IV AB; surgery and GI consulted ,  conservative management
on  IV AB; surgery and GI consulted ,  conservative management, family refused MRCP and ERCP
on  IV AB; surgery and GI consult, conservative management

## 2018-08-19 NOTE — PROGRESS NOTE ADULT - SUBJECTIVE AND OBJECTIVE BOX
CURRENT COMPLAINTS:  Pt notes diarrhea and Na+ went from 120 to 133, but patient is still distended.  No pain, nausea or vomiting.  Pt is now attempting solid diet.  No blood per rectum.  LFTs abnormal, awaiting MRCP, but if patient cannot undergo it, Dr Mai will need to be contacted for the possibility of ERCP to explain why the patient's LHD is dilated.  Hepatitis serologies are negative and the others sent are still pending.  Stool for c. difficile is negative and other stool studies are still pending.    PAST MEDICAL & SURGICAL HISTORY:  Osteomyelitis  Pneumonia  Arthritis  Prostate cancer  Sleep apnea  Diverticulosis  HTN (hypertension)  Diabetes  H/O nephrolithotomy with removal of calculi  H/O laminectomy      ALLERGIES  No Known Allergies      MEDICATIONS  aspirin  chewable 81 milliGRAM(s) Oral daily  enalapril 5 milliGRAM(s) Oral daily  fentaNYL   Patch  12 MICROgram(s)/Hr 1 Patch Transdermal every 72 hours  heparin  Injectable 5000 Unit(s) SubCutaneous every 12 hours  lactobacillus acidophilus 1 Tablet(s) Oral two times a day with meals  meropenem  IVPB      meropenem  IVPB 500 milliGRAM(s) IV Intermittent every 8 hours  metoprolol tartrate 25 milliGRAM(s) Oral two times a day  mupirocin 2% Ointment 1 Application(s) Topical two times a day  nortriptyline 10 milliGRAM(s) Oral at bedtime  pantoprazole    Tablet 40 milliGRAM(s) Oral before breakfast  senna 1 Tablet(s) Oral at bedtime  sodium chloride 0.9%. 1000 milliLiter(s) IV Continuous <Continuous>  zolpidem 5 milliGRAM(s) Oral at bedtime PRN  zolpidem 5 milliGRAM(s) Oral at bedtime PRN      Physical Exam:  Vital Signs Last 24 Hrs  T(C): 35.8 (19 Aug 2018 06:22), Max: 37.1 (18 Aug 2018 14:20)  T(F): 96.4 (19 Aug 2018 06:22), Max: 98.7 (18 Aug 2018 14:20)  HR: 70 (19 Aug 2018 06:22) (70 - 76)  BP: 163/70 (19 Aug 2018 06:22) (146/65 - 163/70)  BP(mean): --  RR: 18 (19 Aug 2018 06:22) (18 - 18)  SpO2: --  HEENT:          Anicteric, neck supple, no JVD.  Chest:            No rales, rhonchi or wheezes.  Full breath sounds.  Cardiac:         RRR No S3/S4  Abdomen:     Soft, non-tender, distended, normoactive bowel sounds.  Extremities:  No edema  Neurologic:   Alert and oriented x 3.    Laboratories:    CBC Full  -  ( 18 Aug 2018 07:40 )  WBC Count : 10.50 K/uL  Hemoglobin : 12.3 g/dL  Hematocrit : 35.1 %  Platelet Count - Automated : 91 K/uL  Mean Cell Volume : 80.5 fL  Mean Cell Hemoglobin : 28.2 pg  Mean Cell Hemoglobin Concentration : 35.0 g/dL  Auto Neutrophil # : x  Auto Lymphocyte # : x  Auto Monocyte # : x  Auto Eosinophil # : x  Auto Basophil # : x  Auto Neutrophil % : x  Auto Lymphocyte % : x  Auto Monocyte % : x  Auto Eosinophil % : x  Auto Basophil % : x    08-18    120<L>  |  89<L>  |  6<L>  ----------------------------<  70  5.4<H>   |  18  |  0.7    Ca    7.6<L>      18 Aug 2018 07:40  Mg     1.7     08-18    TPro  6.0  /  Alb  2.9<L>  /  TBili  3.3<H>  /  DBili  x   /  AST  66<H>  /  ALT  20  /  AlkPhos  621<H>  08-18    PT/INR - ( 17 Aug 2018 08:58 )   PT: 22.50 sec;   INR: 2.05 ratio         PTT - ( 17 Aug 2018 08:58 )  PTT:37.7 sec  Hepatic Serologies:          08-17-18 @ 19:06  Hepatitis A IgM Ab: Nonreact  Hepatitis A Total IgM & IgG Ab:--  HBsAg: Nonreact  HBeAg:--  HBeAb:--  HBcAb-IgM: Nonreact  HBsAb:--  HCV Ab: --  Hepatitis C Virus RNA Detection by PCR: --  OLENA: --  ASMA: --  AMA: --  Ceruloplasm: --  Ferritin: --  % saturation:--  Alpha One Anti-Trypsin Level:--             Radiologic Imaging:

## 2018-08-19 NOTE — PROGRESS NOTE ADULT - PROBLEM SELECTOR PLAN 1
continue with gentle NS IV and follow daily labs; he has had signif and progressive drop of Na as outpatient over past 10 days; luckily no evid of seizure activity
resolved, on  Meropenem,  ID consult pending,  WBC WNL
resolved, on  Meropenem,  ID consult pending,  WBC WNL, f/u blood culture
start Meropenem, d/c Cipro and flagyl, ID consult, monitor WBC, f/u blood culture

## 2018-08-19 NOTE — PROGRESS NOTE ADULT - SUBJECTIVE AND OBJECTIVE BOX
85 year old gentleman with chronic indwelling enrique with hx prostate cancer is being followed as OP by [facility] Visit PR[facility]; he has been treated with oral cipro/flagyl for suspected biliary sepsis but all parameters worsening with increasing wbc and dec serum Na; patient was very reluctant to come to hospital but eventually agreed after conferring with PCP Dr. Maday Baeza.  In ED Na of 119 (last month was NL), wbc of 16 (baseline of 7); abdominal US and abdominal CT both demonstrate cirrhosis with gallstones and sludge, small amount of ascitics and CT describing portal HTN with low volume ascitics admitted and started on IV NS, IV Cipro and IV metronidazole.  Case d/w Dr Baeza, will start Meropenem, consult ID, surgery and GI.  Today pt feels OK, he denies any pain, eating well, will d/c dash diet and add salt to meals, wife by the bedside.     Vital Signs Last 24 Hrs  T(C): 35.8 (19 Aug 2018 06:22), Max: 37.1 (18 Aug 2018 14:20)  T(F): 96.4 (19 Aug 2018 06:22), Max: 98.7 (18 Aug 2018 14:20)  HR: 70 (19 Aug 2018 06:22) (70 - 76)  BP: 163/70 (19 Aug 2018 06:22) (146/65 - 163/70)  BP(mean): --  RR: 18 (19 Aug 2018 06:22) (18 - 18)      PHYSICAL EXAM:  GENERAL: NAD, wheelchair bound   HEAD:  Atraumatic, Normocephalic  NECK: Supple, No JVD, Normal thyroid  NERVOUS SYSTEM:  Alert & Oriented X3, Good concentration; paraplegic   CHEST/LUNG: decreased BS at bases; No rales, rhonchi, wheezing, or rubs  HEART: Regular rate and rhythm; No murmurs, rubs, or gallops  ABDOMEN: Soft, Nontender, distended with positive BS , Enrique cath with dark urine   EXTREMITIES:  2+  edema on both ankles   LYMPH: No lymphadenopathy noted  SKIN: pt has small open wound on his back , w/o signs of infection ( since )       LABS:                                   12.2   6.75  )-----------( 65       ( 19 Aug 2018 08:17 )             35.1   08-19    128<L>  |  94<L>  |  7<L>  ----------------------------<  92  4.0   |  20  |  0.7    Ca    7.5<L>      19 Aug 2018 08:17  Mg     1.8         TPro  5.7<L>  /  Alb  2.9<L>  /  TBili  3.2<H>  /  DBili  x   /  AST  82<H>  /  ALT  23  /  AlkPhos  801<H>    Osmolality, Random Urine (18 @ 13:37)    Osmolality, Random Urine: 251 mos/kg  Sodium, Random Urine: <20.0: Reference Ranges have NOT been established for random urine analytes due  to variability in fluid intake and concentration. mmoL/L (18 @ 13:37)            Urinalysis Basic - ( 16 Aug 2018 19:25 )    Color: Yellow / Appearance: Clear / S.020 / pH: x  Gluc: x / Ketone: Negative  / Bili: Negative / Urobili: 0.2 mg/dL   Blood: x / Protein: Trace mg/dL / Nitrite: Negative   Leuk Esterase: Large / RBC: >50 /HPF / WBC >50 /HPF   Sq Epi: x / Non Sq Epi: x / Bacteria: Few    RADIOLOGY & ADDITIONAL TESTS:  < from: US Abdomen Limited (18 @ 20:43) >  IMPRESSION:    1.  Cirrhotic liver.    2.  Cholelithiasis and sludge with nonspecific gallbladder wall   thickening.    3.  Small volume ascites.    < from: CT Abdomen and Pelvis w/ IV Cont (18 @ 19:17) >  IMPRESSION:   1.  Cirrhosis with evidence of portal hypertension including small volume   abdominopelvic ascites.    2.  Mild left intrahepatic biliary ductal dilation. Correlate with LFTs.    3.  Gallbladder sludge and/or cholelithiasis.    < from: Xray Chest 1 View-PORTABLE IMMEDIATE (18 @ 18:16) >  Impression:      No radiographic evidence of acute cardiopulmonary disease.    MEDICATIONS  (STANDING):  MEDICATIONS  (STANDING):  aspirin  chewable 81 milliGRAM(s) Oral daily  enalapril 5 milliGRAM(s) Oral daily  fentaNYL   Patch  12 MICROgram(s)/Hr 1 Patch Transdermal every 72 hours  lactobacillus acidophilus 1 Tablet(s) Oral two times a day with meals  meropenem  IVPB      meropenem  IVPB 500 milliGRAM(s) IV Intermittent every 8 hours  metoprolol tartrate 25 milliGRAM(s) Oral two times a day  mupirocin 2% Ointment 1 Application(s) Topical two times a day  nortriptyline 10 milliGRAM(s) Oral at bedtime  nystatin Powder 1 Application(s) Topical three times a day  pantoprazole    Tablet 40 milliGRAM(s) Oral before breakfast  senna 1 Tablet(s) Oral at bedtime  sodium chloride 0.9%. 1000 milliLiter(s) (50 mL/Hr) IV Continuous <Continuous>    MEDICATIONS  (PRN):  zolpidem 5 milliGRAM(s) Oral at bedtime PRN Insomnia  zolpidem 5 milliGRAM(s) Oral at bedtime PRN Insomnia

## 2018-08-19 NOTE — PROGRESS NOTE ADULT - PROBLEM SELECTOR PLAN 5
carb consistent diet, monitor finger stick, start Insulin
carb consistent diet, monitor finger stick
carb consistent diet, monitor finger stick, start Insulin

## 2018-08-19 NOTE — PROGRESS NOTE ADULT - PROBLEM SELECTOR PLAN 4
new diagnosis, will check hepatitis panel , GI evaluation, avoid hepatotoxic medication, check Ammonia level
new diagnosis, will check hepatitis panel, f/u work up requested by GI, pt is not able to go for MRCP due to inability to lay flat on his back after complicated back surgery ( has metal hard wear with open wound) avoid hepatotoxic medication, ( family refused ERCP), case d/w GI attending today
new diagnosis, will check hepatitis panel, f/u work up requested by GI, pt is not able to go for MRCP due to inability to lay flat on his back after complicated back surgery ( has metal hard wear with open wound) avoid hepatotoxic medication, check Ammonia level

## 2018-08-19 NOTE — CONSULT NOTE ADULT - SUBJECTIVE AND OBJECTIVE BOX
NEPHROLOGY CONSULTATION NOTE    Patient is a 85y Male whom presented to the hospital with possible biliary sepsis, seen for hyponatremia - Na 119-121 on NS 75-50 cc/hr. Pt was on clear liquid diet until last night.    86yo male being treated at home with oral antibiotics (Cipro and Flagyl) for gall stones (per ER) was advised to come to the hospital by his PMD due to elevated WBC and his wife reported increased abdominal distention. Patient himself (there is mention of some confusion) denies any pain, fevers or vomiting and he claims to be tolerating his liquid diet at home. (16 Aug 2018 22:30)  He denies ever being told of abnormal liver functions, denies alcohol use, denies history of hepatitis.    PAST MEDICAL & SURGICAL HISTORY:  Osteomyelitis  Pneumonia  Arthritis  Prostate cancer  Sleep apnea  Diverticulosis  HTN (hypertension)  Diabetes  H/O nephrolithotomy with removal of calculi  H/O laminectomy    Allergies:  No Known Allergies    Home Medications Reviewed  Hospital Medications:   MEDICATIONS  (STANDING):  aspirin  chewable 81 milliGRAM(s) Oral daily  enalapril 5 milliGRAM(s) Oral daily  fentaNYL   Patch  12 MICROgram(s)/Hr 1 Patch Transdermal every 72 hours  heparin  Injectable 5000 Unit(s) SubCutaneous every 12 hours  lactobacillus acidophilus 1 Tablet(s) Oral two times a day with meals  meropenem  IVPB      meropenem  IVPB 500 milliGRAM(s) IV Intermittent every 8 hours  metoprolol tartrate 25 milliGRAM(s) Oral two times a day  mupirocin 2% Ointment 1 Application(s) Topical two times a day  nortriptyline 10 milliGRAM(s) Oral at bedtime  pantoprazole    Tablet 40 milliGRAM(s) Oral before breakfast  senna 1 Tablet(s) Oral at bedtime  sodium chloride 0.9%. 1000 milliLiter(s) (50 mL/Hr) IV Continuous <Continuous>      SOCIAL HISTORY:  Denies ETOH,Smoking,   FAMILY HISTORY:  No pertinent family history in first degree relatives    Yes      REVIEW OF SYSTEMS:  CONSTITUTIONAL: No weakness, fevers or chills  EYES/ENT: No visual changes;  No vertigo or throat pain   NECK: No pain or stiffness  RESPIRATORY: No cough, wheezing, hemoptysis; No shortness of breath  CARDIOVASCULAR: No chest pain or palpitations.  GASTROINTESTINAL: No abdominal or epigastric pain. No nausea, vomiting, or hematemesis; Diarrhea improved.  GENITOURINARY: No dysuria, frequency, foamy urine, urinary urgency, incontinence or hematuria  SKIN: No itching, burning, rashes, or lesions   VASCULAR: mild bilateral lower extremity edema.   All other review of systems is negative unless indicated above.    VITALS:  T(F): 96.4 (18 @ 06:22), Max: 98.7 (18 @ 14:20)  HR: 70 (18 @ 06:22)  BP: 163/70 (18 @ 06:22)  RR: 18 (18 @ 06:22)  SpO2: --     @ 07:  -   @ 07:00  --------------------------------------------------------  IN: 1000 mL / OUT: 2000 mL / NET: -1000 mL          18 @ 07:  -  18 @ 07:00  --------------------------------------------------------  IN: 0 mL / OUT: 2000 mL / NET: -2000 mL      I&O's Detail    18 Aug 2018 07:01  -  19 Aug 2018 07:00  --------------------------------------------------------  IN:    sodium chloride 0.9%: 1000 mL  Total IN: 1000 mL    OUT:    Indwelling Catheter - Urethral: 2000 mL  Total OUT: 2000 mL    Total NET: -1000 mL            PHYSICAL EXAM:  Constitutional: NAD  HEENT: anicteric sclera, MMM  Neck: No JVD  Respiratory: CTAB, no wheezes, rales or rhonchi  Cardiovascular: S1, S2, RRR  Gastrointestinal: BS+, soft, NT/ND  Extremities: No peripheral edema  Neurological: Awake alert  Psychiatric: Normal mood, normal affect  : No CVA tenderness. Chronic indwelling enrique.   Skin: No rashes  Vascular Access:    LABS:      120<L>  |  89<L>  |  6<L>  ----------------------------<  70  5.4<H>   |  18  |  0.7    SODIUM TREND:  Sodium 120 [ @ 07:40]  Sodium 120 [ @ 11:16]  Sodium 121 [ @ 08:58]  Sodium 119 [ @ 17:30]    Ca    7.6<L>      18 Aug 2018 07:40  Mg     1.7         TPro  6.0  /  Alb  2.9<L>  /  TBili  3.3<H>  /  DBili      /  AST  66<H>  /  ALT  20  /  AlkPhos  621<H>      Creatinine Trend: 0.7 <--, 0.7 <--, 0.8 <--, 0.8 <--                        12.3   10.50 )-----------( 91       ( 18 Aug 2018 07:40 )             35.1     Urine Studies:  Urinalysis Basic - ( 16 Aug 2018 19:25 )    Color: Yellow / Appearance: Clear / S.020 / pH:   Gluc:  / Ketone: Negative  / Bili: Negative / Urobili: 0.2 mg/dL   Blood:  / Protein: Trace mg/dL / Nitrite: Negative   Leuk Esterase: Large / RBC: >50 /HPF / WBC >50 /HPF   Sq Epi:  / Non Sq Epi:  / Bacteria: Few      Calcium, Random Urine: 9 mg/dL ( @ 13:37)  Creatinine, Random Urine: 51 mg/dL ( @ 13:37)  Sodium, Random Urine: <20.0 mmoL/L ( @ 13:37)  Osmolality, Random Urine: 251 mos/kg ( @ 13:37)  Potassium, Random Urine: 15 mmol/L ( @ 13:37)            RADIOLOGY & ADDITIONAL STUDIES:    < from: CT Abdomen and Pelvis w/ IV Cont (18 @ 19:17) >  HEPATOBILIARY: Heterogeneous liver with a nodular contour. Mild left   intrahepatic biliary ductal dilation. Gallbladder sludge and/or   cholelithiasis.    SPLEEN: Multiple splenic varices. Unremarkable spleen.     PANCREAS: Unremarkable.    ADRENAL GLANDS: Unremarkable.    KIDNEYS: Symmetric renal enhancement bilaterally. No hydronephrosis.   Circumaortic left renal vein.    ABDOMINOPELVIC NODES: No lymphadenopathy.    PELVIC ORGANS: Urinary bladder decompressed with Enrique catheter in place.   Prostatic brachytherapy seeds..    PERITONEUM/MESENTERY/BOWEL: Small volume abdominopelvic ascites. No bowel   obstruction or pneumoperitoneum. Unremarkable appendix. Gastroesophageal   varices noted.    BONES/SOFT TISSUES: Bone island in the right ilium. Diffuse osteopenia.   Degenerative changes of the thoracolumbar spine and bilateral hips noted.    OTHER: Atherosclerotic vascular calcifications.      IMPRESSION:   1.  Cirrhosis with evidence of portal hypertension including small volume   abdominopelvic ascites.    2.  Mild left intrahepatic biliary ductal dilation. Correlate with LFTs.    3.  Gallbladder sludge and/or cholelithiasis.    < end of copied text >

## 2018-08-19 NOTE — PROGRESS NOTE ADULT - PROBLEM SELECTOR PLAN 3
monitor LFTS and concurrent renal function as well; focus should be on those things where we can affect  change (normalize Na, resolve sepsis) and then will continue to care for patient at home with palliative approach
off IV fluids, add salt to diet,  f/u repeat sodium level,  nephrology consulted ,f/u  TSH, Free T4.
on gentle IV hydration, monitor urine output, f/u repeat sodium level, will consult nephrology, check urine electrolytes and osmolarity, TSH, Free T4.
on gentle IV hydration, monitor urine output, f/u repeat sodium level

## 2018-08-19 NOTE — CONSULT NOTE ADULT - ASSESSMENT
Patient is a 85y old  Male who  is currently being treated for known cholelithiasis with cipro and flagyl, now presents to the ER for evaluation  of  elevated WBC despite antibiotics (16 Aug 2018 22:30)8He has no fever or chills, started on Meropenem and the ID consult requested to assist with further antibiotic management.    # Cholecystitis  / cholelithiasis    would recommend:    1. Change Meropenem to Cefepime   2. Complete the course of antibiotic  3. Monitor kidney function    will follow the patient with you and make further recommendation based on the clinical course and Lab results  Thank you for the opportunity to participate in Mr. PALENCIA's care
85/M with hyponatremia, admitted with elevated LFTs, noted to have nodular liver and small ascites (no etoh or hepatits history), has LE edema and indwelling Sosa.    Hyponatremia - Baseline Na 134 ON 6/18/18  -  low serum sodium <20 and Urine Osm relatively low 251    - likely multifactorial - low solute intake (was on clears until yesterday, tea and toast diet)  - r/o CHF - LE edema with albumin 2.9 is unlikely nephrotic or due to cirrhosis  RX: Serum osm, TSH,   - Lasix 20 mg po qd (K 5.4)  - may d/c NS  - increase solute protein and liberal salt intake (d/c DASH diet)  - please check Na 2x day    Liver Cirrhosis - w/u ordered, not on ABx    Will follow
A/P:     1. chronic cholecytistis   - GI eval in am   - IV cipro and flagyl  - pan cx   - send hep panel  - CT scan reviewed  - 2 d echo in am     2. Hyponatermia 2/2 to dehydartion   - IV fluids  - send urine lytes    3. DM   - monitor fs    4. HTN   - stable     5. DVT ppx
85yM with hx of DM, HTN, prostate CA, laminectomy, indwelling enrique catheter consulted for cholelithiasis with elevated LFTs r/o choledocholethiasis/cholelithiasis.

## 2018-08-19 NOTE — PROGRESS NOTE ADULT - PROBLEM SELECTOR PROBLEM 3
Hepatic cirrhosis, unspecified hepatic cirrhosis type, unspecified whether ascites present
Hyponatremia

## 2018-08-19 NOTE — PROGRESS NOTE ADULT - PROBLEM SELECTOR PROBLEM 5
Type 2 diabetes mellitus with complication, unspecified whether long term insulin use

## 2018-08-19 NOTE — PROGRESS NOTE ADULT - NSHPATTENDINGPLANDISCUSS_GEN_ALL_CORE
patient
patient and Dr Baeza
house staff , GI attending and pts wife
house staff , GI attending and pts wife

## 2018-08-19 NOTE — PROGRESS NOTE ADULT - PROBLEM SELECTOR PLAN 6
pt had initial Sx in 2014 with complications ( with surgical revision in 2015), has small open wounds on his back, w/o signs of infection

## 2018-08-19 NOTE — CONSULT NOTE ADULT - CONSULT REASON
Abnormal liver functions
Hyponatremia
hyponatremia, elevated wbc , chronic cholecytitis
Cholecystitis
Cholelithiasis with elevated LFTs

## 2018-08-19 NOTE — PROGRESS NOTE ADULT - PROBLEM SELECTOR PROBLEM 1
Hyponatremia
Sepsis, due to unspecified organism

## 2018-08-20 ENCOUNTER — TRANSCRIPTION ENCOUNTER (OUTPATIENT)
Age: 83
End: 2018-08-20

## 2018-08-20 VITALS
DIASTOLIC BLOOD PRESSURE: 66 MMHG | RESPIRATION RATE: 16 BRPM | SYSTOLIC BLOOD PRESSURE: 152 MMHG | OXYGEN SATURATION: 98 % | TEMPERATURE: 97 F | HEART RATE: 76 BPM

## 2018-08-20 LAB
% ALBUMIN: 49 % — SIGNIFICANT CHANGE UP
% ALPHA 1: 6 % — SIGNIFICANT CHANGE UP
% ALPHA 2: 8.6 % — SIGNIFICANT CHANGE UP
% BETA: 11.8 % — SIGNIFICANT CHANGE UP
% GAMMA: 24.6 % — SIGNIFICANT CHANGE UP
-  AMPICILLIN/SULBACTAM: SIGNIFICANT CHANGE UP
-  CEFAZOLIN: SIGNIFICANT CHANGE UP
-  DAPTOMYCIN: SIGNIFICANT CHANGE UP
-  GENTAMICIN: SIGNIFICANT CHANGE UP
-  LINEZOLID: SIGNIFICANT CHANGE UP
-  OXACILLIN: SIGNIFICANT CHANGE UP
-  PENICILLIN: SIGNIFICANT CHANGE UP
-  RIFAMPIN: SIGNIFICANT CHANGE UP
-  TETRACYCLINE: SIGNIFICANT CHANGE UP
-  TRIMETHOPRIM/SULFAMETHOXAZOLE: SIGNIFICANT CHANGE UP
-  VANCOMYCIN: SIGNIFICANT CHANGE UP
ALBUMIN SERPL ELPH-MCNC: 2.9 G/DL — LOW (ref 3.6–5.5)
ALBUMIN SERPL ELPH-MCNC: 3 G/DL — LOW (ref 3.5–5.2)
ALBUMIN/GLOB SERPL ELPH: 0.9 RATIO — SIGNIFICANT CHANGE UP
ALP SERPL-CCNC: 866 U/L — HIGH (ref 30–115)
ALPHA1 GLOB SERPL ELPH-MCNC: 0.4 G/DL — SIGNIFICANT CHANGE UP (ref 0.1–0.4)
ALPHA2 GLOB SERPL ELPH-MCNC: 0.5 G/DL — SIGNIFICANT CHANGE UP (ref 0.5–1)
ALT FLD-CCNC: 32 U/L — SIGNIFICANT CHANGE UP (ref 0–41)
ANION GAP SERPL CALC-SCNC: 8 MMOL/L — SIGNIFICANT CHANGE UP (ref 7–14)
AST SERPL-CCNC: 115 U/L — HIGH (ref 0–41)
B-GLOBULIN SERPL ELPH-MCNC: 0.7 G/DL — SIGNIFICANT CHANGE UP (ref 0.5–1)
BILIRUB SERPL-MCNC: 2.9 MG/DL — HIGH (ref 0.2–1.2)
BUN SERPL-MCNC: 8 MG/DL — LOW (ref 10–20)
CALCIUM SERPL-MCNC: 7.9 MG/DL — LOW (ref 8.5–10.1)
CHLORIDE SERPL-SCNC: 97 MMOL/L — LOW (ref 98–110)
CO2 SERPL-SCNC: 26 MMOL/L — SIGNIFICANT CHANGE UP (ref 17–32)
CREAT SERPL-MCNC: 0.6 MG/DL — LOW (ref 0.7–1.5)
CULTURE RESULTS: SIGNIFICANT CHANGE UP
GAMMA GLOBULIN: 1.5 G/DL — SIGNIFICANT CHANGE UP (ref 0.6–1.6)
GLUCOSE SERPL-MCNC: 114 MG/DL — HIGH (ref 70–99)
HCT VFR BLD CALC: 36.5 % — LOW (ref 42–52)
HGB BLD-MCNC: 12.5 G/DL — LOW (ref 14–18)
MAGNESIUM SERPL-MCNC: 1.7 MG/DL — LOW (ref 1.8–2.4)
MCHC RBC-ENTMCNC: 28.2 PG — SIGNIFICANT CHANGE UP (ref 27–31)
MCHC RBC-ENTMCNC: 34.2 G/DL — SIGNIFICANT CHANGE UP (ref 32–37)
MCV RBC AUTO: 82.2 FL — SIGNIFICANT CHANGE UP (ref 80–94)
METHOD TYPE: SIGNIFICANT CHANGE UP
NRBC # BLD: 0 /100 WBCS — SIGNIFICANT CHANGE UP (ref 0–0)
ORGANISM # SPEC MICROSCOPIC CNT: SIGNIFICANT CHANGE UP
ORGANISM # SPEC MICROSCOPIC CNT: SIGNIFICANT CHANGE UP
PLATELET # BLD AUTO: 65 K/UL — LOW (ref 130–400)
POTASSIUM SERPL-MCNC: 4.7 MMOL/L — SIGNIFICANT CHANGE UP (ref 3.5–5)
POTASSIUM SERPL-SCNC: 4.7 MMOL/L — SIGNIFICANT CHANGE UP (ref 3.5–5)
PROT PATTERN SERPL ELPH-IMP: SIGNIFICANT CHANGE UP
PROT SERPL-MCNC: 6 G/DL — SIGNIFICANT CHANGE UP (ref 6–8)
PROT SERPL-MCNC: 6 G/DL — SIGNIFICANT CHANGE UP (ref 6–8.3)
PROT SERPL-MCNC: 6 G/DL — SIGNIFICANT CHANGE UP (ref 6–8.3)
RBC # BLD: 4.44 M/UL — LOW (ref 4.7–6.1)
RBC # FLD: 17.2 % — HIGH (ref 11.5–14.5)
SODIUM SERPL-SCNC: 131 MMOL/L — LOW (ref 135–146)
SPECIMEN SOURCE: SIGNIFICANT CHANGE UP
WBC # BLD: 7.64 K/UL — SIGNIFICANT CHANGE UP (ref 4.8–10.8)
WBC # FLD AUTO: 7.64 K/UL — SIGNIFICANT CHANGE UP (ref 4.8–10.8)

## 2018-08-20 RX ORDER — ASPIRIN/CALCIUM CARB/MAGNESIUM 324 MG
1 TABLET ORAL
Qty: 0 | Refills: 0 | COMMUNITY

## 2018-08-20 RX ORDER — SENNA PLUS 8.6 MG/1
0 TABLET ORAL
Qty: 0 | Refills: 0 | COMMUNITY

## 2018-08-20 RX ORDER — NYSTATIN CREAM 100000 [USP'U]/G
1 CREAM TOPICAL
Qty: 1 | Refills: 0 | OUTPATIENT
Start: 2018-08-20 | End: 2018-09-03

## 2018-08-20 RX ORDER — LACTOBACILLUS ACIDOPHILUS 100MM CELL
1 CAPSULE ORAL
Qty: 0 | Refills: 0 | COMMUNITY
Start: 2018-08-20

## 2018-08-20 RX ORDER — MAGNESIUM SULFATE 500 MG/ML
1 VIAL (ML) INJECTION ONCE
Qty: 0 | Refills: 0 | Status: COMPLETED | OUTPATIENT
Start: 2018-08-20 | End: 2018-08-20

## 2018-08-20 RX ORDER — CEPHALEXIN 500 MG
1 CAPSULE ORAL
Qty: 12 | Refills: 0 | OUTPATIENT
Start: 2018-08-20 | End: 2018-08-25

## 2018-08-20 RX ADMIN — NYSTATIN CREAM 1 APPLICATION(S): 100000 CREAM TOPICAL at 13:31

## 2018-08-20 RX ADMIN — Medication 1 TABLET(S): at 08:08

## 2018-08-20 RX ADMIN — Medication 81 MILLIGRAM(S): at 12:12

## 2018-08-20 RX ADMIN — Medication 100 GRAM(S): at 12:13

## 2018-08-20 RX ADMIN — Medication 1 TABLET(S): at 17:54

## 2018-08-20 RX ADMIN — NYSTATIN CREAM 1 APPLICATION(S): 100000 CREAM TOPICAL at 05:26

## 2018-08-20 RX ADMIN — MEROPENEM 100 MILLIGRAM(S): 1 INJECTION INTRAVENOUS at 05:27

## 2018-08-20 RX ADMIN — Medication 25 MILLIGRAM(S): at 17:54

## 2018-08-20 RX ADMIN — Medication 25 MILLIGRAM(S): at 05:29

## 2018-08-20 RX ADMIN — PANTOPRAZOLE SODIUM 40 MILLIGRAM(S): 20 TABLET, DELAYED RELEASE ORAL at 06:50

## 2018-08-20 RX ADMIN — FENTANYL CITRATE 1 PATCH: 50 INJECTION INTRAVENOUS at 11:28

## 2018-08-20 RX ADMIN — MEROPENEM 100 MILLIGRAM(S): 1 INJECTION INTRAVENOUS at 13:30

## 2018-08-20 RX ADMIN — MUPIROCIN 1 APPLICATION(S): 20 OINTMENT TOPICAL at 05:27

## 2018-08-20 RX ADMIN — MUPIROCIN 1 APPLICATION(S): 20 OINTMENT TOPICAL at 17:56

## 2018-08-20 RX ADMIN — Medication 5 MILLIGRAM(S): at 05:29

## 2018-08-20 NOTE — DISCHARGE NOTE ADULT - CARE PROVIDER_API CALL
Maday Baeza (), Geriatric Medicine; Internal Medicine  [address] [place] [address]  Phone: [phone]  Fax: [phone]    Kourtney Stephenson), Internal Medicine  [address] [place] [address]  Phone: [phone]  Fax: [phone]

## 2018-08-20 NOTE — PROGRESS NOTE ADULT - SUBJECTIVE AND OBJECTIVE BOX
infectious diseases progress note:  BREANN KRAFT is a 85yMale patient    CHOLECYSTITIS HYPOMAGNESENIA HYPONATREMIA    Urinary retention  Spinal fusion failure, sequela  Sepsis, due to unspecified organism  Cholelithiasis  Coagulopathy  Hepatic cirrhosis, unspecified hepatic cirrhosis type, unspecified whether ascites present  Leukocytosis, unspecified type  Type 2 diabetes mellitus with complication, unspecified whether long term insulin use  Hypertension, unspecified type  Prostate cancer  Arthritis  Hyponatremia  Hypomagnesemia  Cholecystitis      ROS:  CONSTITUTIONAL:  Negative fever or chills, feels well, good appetite  EYES:  Negative  blurry vision or double vision  CARDIOVASCULAR:  Negative for chest pain or palpitations  RESPIRATORY:  Negative for cough, wheezing, or SOB   GASTROINTESTINAL:  Negative for nausea, vomiting, diarrhea, constipation, or abdominal pain  GENITOURINARY:  Negative frequency, urgency or dysuria  NEUROLOGIC:  No headache, confusion, dizziness, lightheadedness    Allergies    No Known Allergies    Intolerances        ANTIBIOTICS/RELEVANT:  antimicrobials  meropenem  IVPB      meropenem  IVPB 500 milliGRAM(s) IV Intermittent every 8 hours    immunologic:    OTHER:  aspirin  chewable 81 milliGRAM(s) Oral daily  enalapril 5 milliGRAM(s) Oral daily  fentaNYL   Patch  12 MICROgram(s)/Hr 1 Patch Transdermal every 72 hours  lactobacillus acidophilus 1 Tablet(s) Oral two times a day with meals  metoprolol tartrate 25 milliGRAM(s) Oral two times a day  mupirocin 2% Ointment 1 Application(s) Topical two times a day  nortriptyline 10 milliGRAM(s) Oral at bedtime  nystatin Powder 1 Application(s) Topical three times a day  pantoprazole    Tablet 40 milliGRAM(s) Oral before breakfast  senna 1 Tablet(s) Oral at bedtime  zolpidem 5 milliGRAM(s) Oral at bedtime PRN  zolpidem 5 milliGRAM(s) Oral at bedtime PRN      Objective:  T(F): 96 (08-20-18 @ 06:45), Max: 96.5 (08-19-18 @ 13:52)  HR: 69 (08-20-18 @ 06:45) (64 - 69)  BP: 152/63 (08-20-18 @ 06:45) (152/63 - 154/72)  RR: 16 (08-20-18 @ 06:45) (16 - 18)  SpO2: 99% (08-19-18 @ 13:52) (99% - 99%)    PHYSICAL EXAM  Constitutional:Well-developed, well nourished  Eyes:SANGITA, EOMI  Ear/Nose/Throat: no oral lesion, no sinus tenderness on percussion	  Neck:no JVD, no lymphadenopathy, supple  Respiratory: CTA brian  Cardiovascular: S1S2 RRR, no murmurs  Gastrointestinal:soft, (+) BS, no HSM  Extremities:no e/e/c    08-19    128<L>  |  94<L>  |  7<L>  ----------------------------<  92  4.0   |  20  |  0.7    Mg     1.8     08-19    TPro  5.7<L>  /  Alb  2.9<L>  /  TBili  3.2<H>  /  DBili  x   /  AST  82<H>  /  ALT  23  /  AlkPhos  801<H>  08-19                            12.2   6.75  )-----------( 65       ( 19 Aug 2018 08:17 )             35.1           Culture - Urine (collected 16 Aug 2018 19:25)  Source: .Urine Clean Catch (Midstream)  Preliminary Report (19 Aug 2018 13:23):    >100,000 CFU/ml Staphylococcus aureus Susceptibility to follow.

## 2018-08-20 NOTE — DISCHARGE NOTE ADULT - SECONDARY DIAGNOSIS.
Hyponatremia Hepatic cirrhosis, unspecified hepatic cirrhosis type, unspecified whether ascites present Diabetes Cholecystitis H/O laminectomy Hypertension, unspecified type

## 2018-08-20 NOTE — DISCHARGE NOTE ADULT - HOSPITAL COURSE
85 year old gentleman with chronic indwelling Sosa with hx prostate cancer is being followed as OP by [facility] MEdical HOme Visit PRogram; he has been treated with oral cipro/flagyl for suspected biliary sepsis but all parameters worsening with increasing wbc and decreased  serum Na; patient was very reluctant to come to hospital but eventually agreed after conferring with PCP Dr. Maday Baeza.  In ED Na of 119 (last month was NL), wbc of 16 (baseline of 7); abdominal US and abdominal CT both demonstrate cirrhosis with gallstones and sludge, small amount of ascitics. Pt was  admitted to medical floor for sepsis (POA) , started on IV NS, IV Cipro and IV metronidazole in ER. Abx were changed to   Meropenem, pt was  consulted by GI, surgery,  hepatitis panel came back negative,  work up that GI requested ordered. Family refused MRCP and ERCP ( considering advance age and risks involved). Total bilirubin was downtrending with elevated Alk Phos,  d/w GI attending, pt and family refused further work up, he was asymptomatic and willing to proceed with conservative management only. Sepsis resolved, pt was consulted by ID, will be discharged home on po Abx to complete the course. For hyponatremia pt was treated with IV fluids, consulted by nephrology, urine osmolarity was low, Dash diet was discontinued and salt added to meals. While in the hospital pt developed thrombocytopenia QS Heparin was d/c, will also stop ASA on discharge.   Today pt was seen and examined at bedside, he denies any complains, wants to go home, stable for discharge.  Case d/w wife, GI attending and PMD today, I spent more than 40 min

## 2018-08-20 NOTE — DISCHARGE NOTE ADULT - PATIENT PORTAL LINK FT
You can access the [facility] Patient [facility], offered by [facility], by registering with the following website: [email or web address]

## 2018-08-20 NOTE — PHYSICAL THERAPY INITIAL EVALUATION ADULT - SPECIFY REASON(S)
As discussed with patient and wife, patient current level of function same as prior level of function. Pt has been non-ambulatory for 5 years, uses memo lift for transfers at home

## 2018-08-20 NOTE — DISCHARGE NOTE ADULT - CARE PROVIDERS DIRECT ADDRESSES
[email or web address]

## 2018-08-20 NOTE — PHYSICAL THERAPY INITIAL EVALUATION ADULT - DISCHARGE DISPOSITION, PT EVAL
current level of function same as prior level of function; does not require acute PT interventions at this time.  Re-consult PT as necessary./no skilled PT needs

## 2018-08-20 NOTE — DISCHARGE NOTE ADULT - CARE PLAN
Principal Discharge DX:	Sepsis, due to unspecified organism  Goal:	resolved  Assessment and plan of treatment:	take all meds as prescribed ,f/u with PMD  Secondary Diagnosis:	Hyponatremia  Assessment and plan of treatment:	comply with diet, repeat blood work in one week ,f/u with PMD  Secondary Diagnosis:	Hepatic cirrhosis, unspecified hepatic cirrhosis type, unspecified whether ascites present  Assessment and plan of treatment:	avoid hepatotoxic medications, do not take Tylenol, f/u with GI  Secondary Diagnosis:	Diabetes  Assessment and plan of treatment:	comply with diet, take all meds as prescribed ,f/u with PMD  Secondary Diagnosis:	Cholecystitis  Assessment and plan of treatment:	complete course of Abx, f/u with PMD and GI  Secondary Diagnosis:	H/O laminectomy  Assessment and plan of treatment:	c/w local wound care, f/u with PMD, prevent falls  Secondary Diagnosis:	Hypertension, unspecified type  Assessment and plan of treatment:	take all meds as prescribed ,f/u with PMD

## 2018-08-20 NOTE — PROGRESS NOTE ADULT - SUBJECTIVE AND OBJECTIVE BOX
Nephrology progress note    Patient is seen and examined, events over the last 24 h noted .    Allergies:  No Known Allergies    Hospital Medications:   MEDICATIONS  (STANDING):  aspirin  chewable 81 milliGRAM(s) Oral daily  enalapril 5 milliGRAM(s) Oral daily  fentaNYL   Patch  12 MICROgram(s)/Hr 1 Patch Transdermal every 72 hours  lactobacillus acidophilus 1 Tablet(s) Oral two times a day with meals  meropenem  IVPB      meropenem  IVPB 500 milliGRAM(s) IV Intermittent every 8 hours  metoprolol tartrate 25 milliGRAM(s) Oral two times a day  mupirocin 2% Ointment 1 Application(s) Topical two times a day  nortriptyline 10 milliGRAM(s) Oral at bedtime  nystatin Powder 1 Application(s) Topical three times a day  pantoprazole    Tablet 40 milliGRAM(s) Oral before breakfast  senna 1 Tablet(s) Oral at bedtime        VITALS:  T(F): 96 (18 @ 06:45), Max: 96.5 (18 @ 13:52)  HR: 69 (18 @ 06:45)  BP: 152/63 (18 @ 06:45)  RR: 16 (18 @ 06:45)  SpO2: 99% (18 @ 13:52)  Wt(kg): --     @ 07:01  -   @ 07:00  --------------------------------------------------------  IN: 1000 mL / OUT: 2000 mL / NET: -1000 mL     @ 07:01  -   @ 07:00  --------------------------------------------------------  IN: 0 mL / OUT: 600 mL / NET: -600 mL          PHYSICAL EXAM:  Constitutional: NAD  HEENT: anicteric sclera,  MMM  Neck: No JVD  Respiratory: CTAB, no wheezes, rales or rhonchi  Cardiovascular: S1, S2, RRR  Gastrointestinal: BS+, soft, NT/ND  Extremities:  1+ peripheral edema  Neurological: A/O x 3, no focal deficits  : No CVA tenderness.  enrique indwelling.   Skin: No rashes  Vascular Access:    LABS:      131<L>  |  97<L>  |  8<L>  ----------------------------<  114<H>  4.7   |  26  |  0.6<L>    SODIUM TREND:  Sodium 131 [ @ 07:14]  Sodium 128 [ @ 08:17]  Sodium 120 [ @ 07:40]  Sodium 120 [ @ 11:16]  Sodium 121 [ @ 08:58]  Sodium 119 [ @ 17:30]    Ca    7.9<L>      20 Aug 2018 07:14  Mg     1.7         TPro  6.0  /  Alb  3.0<L>  /  TBili  2.9<H>  /  DBili      /  AST  115<H>  /  ALT  32  /  AlkPhos  866<H>                            12.5   7.64  )-----------( 65       ( 20 Aug 2018 07:14 )             36.5       Urine Studies:  Urinalysis Basic - ( 16 Aug 2018 19:25 )    Color: Yellow / Appearance: Clear / S.020 / pH:   Gluc:  / Ketone: Negative  / Bili: Negative / Urobili: 0.2 mg/dL   Blood:  / Protein: Trace mg/dL / Nitrite: Negative   Leuk Esterase: Large / RBC: >50 /HPF / WBC >50 /HPF   Sq Epi:  / Non Sq Epi:  / Bacteria: Few      Calcium, Random Urine: 9 mg/dL ( @ 13:37)  Creatinine, Random Urine: 51 mg/dL ( @ 13:37)  Sodium, Random Urine: <20.0 mmoL/L ( @ 13:37)  Osmolality, Random Urine: 251 mos/kg ( @ 13:37)  Potassium, Random Urine: 15 mmol/L ( @ 13:37)    RADIOLOGY & ADDITIONAL STUDIES:

## 2018-08-20 NOTE — DISCHARGE NOTE ADULT - ADDITIONAL INSTRUCTIONS
f/u with PMD and GI after discharge, avoid hepatotoxic medications( do not take tylenol), repeat blood work in one week, comply with diet

## 2018-08-20 NOTE — DISCHARGE NOTE ADULT - MEDICATION SUMMARY - MEDICATIONS TO TAKE
I will START or STAY ON the medications listed below when I get home from the hospital:    fentanyl topical  -- 12mcg/hr  -- Indication: For Pain    enalapril 5 mg oral tablet  -- 1 tab(s) by mouth once a day  -- Indication: For HTN (hypertension)    nortriptyline 10 mg oral capsule  -- Indication: For Depression    zolpidem 10 mg oral tablet  -- 1 tab(s) by mouth once a day (at bedtime)  -- Indication: For insomnia    Metoprolol Tartrate 25 mg oral tablet  -- 1 tab(s) by mouth 2 times a day  -- Indication: For Hypertension, unspecified type    cephalexin 500 mg oral capsule  -- 1 cap(s) by mouth 2 times a day   -- Finish all this medication unless otherwise directed by prescriber.    -- Indication: For Cholecystitis    nystatin 100,000 units/g topical powder  -- 1 application on skin 3 times a day  -- Indication: For Skin rash    mupirocin 2% topical ointment  -- Indication: For Topical cream    lactobacillus acidophilus oral capsule  -- 1 cap(s) by mouth 3 times a day  -- Indication: For GI     pantoprazole 40 mg oral delayed release tablet  -- 1 tab(s) by mouth once a day  -- Indication: For GI

## 2018-08-20 NOTE — PROGRESS NOTE ADULT - ASSESSMENT
85 year old gentleman with  1. cirrhosis  2. hyponatremia  3. likely biliary sepsis    COncur with current medical treatment plan  From geriatric perspective, patient does have insight into his current condition and is able to participate in plan of care and makes his wishes very clear.  Reassured him that as we gather more data he will be part of decision making process.
85 year old gentleman with chronic indwelling enrique with hx prostate cancer is being followed as OP by [facility] MEdical HOme Visit PRogram; he has been treated with oral cipro/flagyl for suspected biliary sepsis but all parameters worsening with increasing wbc and dec serum Na; patient was very reluctant to come to hospital but eventually agreed after conferring with PCP Dr. Maday Baeza.  In ED Na of 119 (last month was NL), wbc of 16 (baseline of 7); abdominal US and abdominal CT both demonstrate cirrhosis with gallstones and sludge, small amount of ascitics and CT describing portal HTN with low volume ascitics admitted and started on IV NS, IV Cipro and IV metronidazole in ER,  Case d/w Dr Baeza, Abx changed to  Meropenem, consulted by GI, surgery, f/u hepatitis panel and work up that GI requested, pt has severe lordosis with h/o back surgery ( wheelchair bound), most likely won't be able to go for an MRCP. Will speak with family. Pt was on liquid diet, had loose stool today, will check stool for C.dif ( as per GI) and keep on contact isolation. ID consult is still pending. I spoke with pts wife, pt is DNR/DNI, prognosis guarded.
85 year old gentleman with chronic indwelling enrique with hx prostate cancer is being followed as OP by [facility] MEdical HOme Visit PRogram; he has been treated with oral cipro/flagyl for suspected biliary sepsis but all parameters worsening with increasing wbc and dec serum Na; patient was very reluctant to come to hospital but eventually agreed after conferring with PCP Dr. Maday Baeza.  In ED Na of 119 (last month was NL), wbc of 16 (baseline of 7); abdominal US and abdominal CT both demonstrate cirrhosis with gallstones and sludge, small amount of ascitics and CT describing portal HTN with low volume ascitics admitted and started on IV NS, IV Cipro and IV metronidazole in ER,  Case d/w Dr Baeza, Abx changed to  Meropenem, consulted by GI, surgery, f/u hepatitis panel and work up that GI requested, pt has severe lordosis with h/o back surgery ( wheelchair bound), most likely won't be able to go for an MRCP. Family refused MRCP and ERCP ( considering advance age and risks involved). Will contact Dr Mai for an advice on Monday, stool was negative for C.Dif ID consult is still pending ( no recommendations). For hyponatremia pt was consulted by nephrology, off IV fluids, add salt to diet, f/u repeat Na in AM. Pt developed thrombocytopenia, hold QS Heparin, monitor platelets.      I spoke with pts wife, pt is DNR/DNI, prognosis guarded.
85/M with hyponatremia, admitted with elevated LFTs, noted to have nodular liver and small ascites (no etoh or hepatits history), has LE edema and indwelling Sosa.    Hyponatremia - Baseline Na 134 ON 6/18/18  -  low serum sodium <20 and Urine Osm relatively low 251    - likely multifactorial - low solute intake, excess free water intake (as per  to reduce UTI)   - sodium improved with NS, fluid restriction orally  - r/o CHF - LE edema with albumin 2.9 is unlikely nephrotic or due to cirrhosis ( as per wife LE edema is new - pt is bedbound)    LE edema - would r/o DVT as well     RX: Serum osm, TSH,   - Lasix 20 mg po qd   - may d/c NS  - increase solute protein and liberal salt intake (d/c DASH diet), moderate fluid restriction 1.5 L daily    Liver Cirrhosis - w/u ordered, not on ABx
85yM with hx of DM, HTN, prostate CA, laminectomy, indwelling enrique catheter consulted for cholelithiasis with elevated LFTs. Markedly elevated alk phos also needs w/u tina in the face of cirrhosis, Colonic distention may be functional, no obvious lesion noted on CT. patient tolerating diet, no complaint of abdominal pain. patient states his abd distention is normal and has been present for many years. passing flatus/ +bowel movements.     PLAN:  - GI evaluation for poss. endoscopy.  - Needs Dulcolax or enemas pr  - No surgical Rx planned at present
Admitted with Sepsis (wbc 13, RR>20, T<96.8)    Leukocytosis and hypothermia resolved on meropenem  IVPB 500 milliGRAM(s) IV Intermittent every 8 hours    Pt with cirrhosis, DM, Prostate CA, Urinary retention, Cholelithiasis, Hyponatremia, Hypomagnesemia, HTN, hx ORSA, VRE, CRE Klebsiella    Pt with bilat atelectasis, mild left intrahepatic biliary ductal dilation & gallbladder sludge and/or cholelithiasis.    Urine C&S with  >100,000 CFU/ml Staphylococcus aureus     PLAN  Continue Meropenem for now to cover biliary tree  Await GI F/U (Family refused ERCP)  Await Susceptibilities of urine isoalte  CHG 4% daily & PRN  continue to follow Na & LFTs  Repeat U/A and urine C&S
Admitted with elevated WBC and distension.  Pt today says he is less distended, but still looks distended.  He says he does not have pain and wants to go home.  He was placed on meropenem on admission empirically.  He denies abdominal pain, nausea, vomiting but admits to loose stool since he has been on a liquid diet (five days for gallstones ??).  On radiologic imaging the patient has a NL CBD, a nodular liver, L IHDD, GB stones and sludge and small volume ascites.    Please check   Obstructive series given distension.  MRCP given L IHDD.  Check CLD work up as per Dr Tai's note.  Check stool for O/P, C/S, C. difficile given loose stool after meals.  Follow CBC (WBC).  Consider ID consult given elevated WBC x 2.
Pt notes diarrhea and Na+ went from 120 to 133, but patient is still distended.  No pain, nausea or vomiting.  Pt is now attempting solid diet.  No blood per rectum.  LFTs abnormal, awaiting MRCP, but if patient cannot undergo it, Dr Mai will need to be contacted for the possibility of ERCP to explain why the patient's LHD is dilated.  Hepatitis serologies are negative and the others sent are still pending.  Stool for c. difficile is negative and other stool studies are still pending.
85 year old gentleman with chronic indwelling enrique with hx prostate cancer is being followed as OP by [facility] MEdical HOme Visit PRogram; he has been treated with oral cipro/flagyl for suspected biliary sepsis but all parameters worsening with increasing wbc and dec serum Na; patient was very reluctant to come to hospital but eventually agreed after conferring with PCP Dr. Maday Baeza.  In ED Na of 119 (last month was NL), wbc of 16 (baseline of 7); abdominal US and abdominal CT both demonstrate cirrhosis with gallstones and sludge, small amount of ascitics and CT describing portal HTN with low volume ascitics admitted and started on IV NS, IV Cipro and IV metronidazole.  Case d/w Dr Baeza, will start Meropenem, consult ID, surgery and GI.

## 2018-08-20 NOTE — DISCHARGE NOTE ADULT - MEDICATION SUMMARY - MEDICATIONS TO STOP TAKING
I will STOP taking the medications listed below when I get home from the hospital:    aspirin 81 mg oral tablet  -- 1 tab(s) by mouth once a day    Senna

## 2018-08-20 NOTE — DISCHARGE NOTE ADULT - PLAN OF CARE
resolved take all meds as prescribed ,f/u with PMD comply with diet, repeat blood work in one week ,f/u with PMD avoid hepatotoxic medications, do not take Tylenol, f/u with GI comply with diet, take all meds as prescribed ,f/u with PMD complete course of Abx, f/u with PMD and GI c/w local wound care, f/u with PMD, prevent falls

## 2018-08-21 LAB
CERULOPLASMIN SERPL-MCNC: 32 MG/DL — SIGNIFICANT CHANGE UP (ref 20–60)
CULTURE RESULTS: SIGNIFICANT CHANGE UP
SPECIMEN SOURCE: SIGNIFICANT CHANGE UP

## 2018-08-22 LAB — ANA TITR SER: NEGATIVE — SIGNIFICANT CHANGE UP

## 2018-08-23 DIAGNOSIS — M86.60 OTHER CHRONIC OSTEOMYELITIS, UNSPECIFIED SITE: ICD-10-CM

## 2018-08-27 PROBLEM — M86.60 OSTEOMYELITIS, CHRONIC: Status: ACTIVE | Noted: 2018-08-27

## 2018-08-29 ENCOUNTER — LABORATORY RESULT (OUTPATIENT)
Age: 83
End: 2018-08-29

## 2018-08-29 ENCOUNTER — OUTPATIENT (OUTPATIENT)
Dept: OUTPATIENT SERVICES | Facility: HOSPITAL | Age: 83
LOS: 1 days | Discharge: HOME | End: 2018-08-29

## 2018-08-29 DIAGNOSIS — R79.89 OTHER SPECIFIED ABNORMAL FINDINGS OF BLOOD CHEMISTRY: ICD-10-CM

## 2018-08-29 DIAGNOSIS — I10 ESSENTIAL (PRIMARY) HYPERTENSION: ICD-10-CM

## 2018-08-29 DIAGNOSIS — E87.1 HYPO-OSMOLALITY AND HYPONATREMIA: ICD-10-CM

## 2018-08-29 DIAGNOSIS — E86.0 DEHYDRATION: ICD-10-CM

## 2018-08-29 DIAGNOSIS — K80.10 CALCULUS OF GALLBLADDER WITH CHRONIC CHOLECYSTITIS WITHOUT OBSTRUCTION: ICD-10-CM

## 2018-08-29 DIAGNOSIS — K81.9 CHOLECYSTITIS, UNSPECIFIED: ICD-10-CM

## 2018-08-29 DIAGNOSIS — F32.9 MAJOR DEPRESSIVE DISORDER, SINGLE EPISODE, UNSPECIFIED: ICD-10-CM

## 2018-08-29 DIAGNOSIS — Z98.890 OTHER SPECIFIED POSTPROCEDURAL STATES: Chronic | ICD-10-CM

## 2018-08-29 DIAGNOSIS — R33.9 RETENTION OF URINE, UNSPECIFIED: ICD-10-CM

## 2018-08-29 DIAGNOSIS — Z66 DO NOT RESUSCITATE: ICD-10-CM

## 2018-08-29 DIAGNOSIS — Z00.00 ENCOUNTER FOR GENERAL ADULT MEDICAL EXAMINATION WITHOUT ABNORMAL FINDINGS: ICD-10-CM

## 2018-08-29 DIAGNOSIS — M86.60 OTHER CHRONIC OSTEOMYELITIS, UNSPECIFIED SITE: ICD-10-CM

## 2018-08-29 DIAGNOSIS — J98.11 ATELECTASIS: ICD-10-CM

## 2018-08-29 DIAGNOSIS — K74.60 UNSPECIFIED CIRRHOSIS OF LIVER: ICD-10-CM

## 2018-08-29 DIAGNOSIS — Z85.46 PERSONAL HISTORY OF MALIGNANT NEOPLASM OF PROSTATE: ICD-10-CM

## 2018-08-29 DIAGNOSIS — D68.4 ACQUIRED COAGULATION FACTOR DEFICIENCY: ICD-10-CM

## 2018-08-29 DIAGNOSIS — D69.6 THROMBOCYTOPENIA, UNSPECIFIED: ICD-10-CM

## 2018-08-29 DIAGNOSIS — Z79.82 LONG TERM (CURRENT) USE OF ASPIRIN: ICD-10-CM

## 2018-08-29 DIAGNOSIS — R18.8 OTHER ASCITES: ICD-10-CM

## 2018-08-29 DIAGNOSIS — E83.42 HYPOMAGNESEMIA: ICD-10-CM

## 2018-08-29 DIAGNOSIS — A41.9 SEPSIS, UNSPECIFIED ORGANISM: ICD-10-CM

## 2018-08-29 DIAGNOSIS — K76.6 PORTAL HYPERTENSION: ICD-10-CM

## 2018-08-29 PROBLEM — J18.9 PNEUMONIA, UNSPECIFIED ORGANISM: Chronic | Status: ACTIVE | Noted: 2018-08-16

## 2018-08-29 PROBLEM — M19.90 UNSPECIFIED OSTEOARTHRITIS, UNSPECIFIED SITE: Chronic | Status: ACTIVE | Noted: 2018-08-16

## 2018-08-29 PROBLEM — C61 MALIGNANT NEOPLASM OF PROSTATE: Chronic | Status: ACTIVE | Noted: 2018-08-16

## 2018-08-29 PROBLEM — E11.9 TYPE 2 DIABETES MELLITUS WITHOUT COMPLICATIONS: Chronic | Status: ACTIVE | Noted: 2018-08-16

## 2018-08-29 PROBLEM — K57.90 DIVERTICULOSIS OF INTESTINE, PART UNSPECIFIED, WITHOUT PERFORATION OR ABSCESS WITHOUT BLEEDING: Chronic | Status: ACTIVE | Noted: 2018-08-16

## 2018-08-29 PROBLEM — G47.30 SLEEP APNEA, UNSPECIFIED: Chronic | Status: ACTIVE | Noted: 2018-08-16

## 2018-08-29 PROBLEM — M86.9 OSTEOMYELITIS, UNSPECIFIED: Chronic | Status: ACTIVE | Noted: 2018-08-16

## 2018-09-05 LAB
CREAT ?TM UR-MCNC: 56 MG/DL — SIGNIFICANT CHANGE UP (ref 20–370)
IRON ?TM UR-MCNC: 42 MCG/DL — SIGNIFICANT CHANGE UP

## 2018-09-26 ENCOUNTER — RX RENEWAL (OUTPATIENT)
Age: 83
End: 2018-09-26
